# Patient Record
Sex: MALE | Race: WHITE | Employment: UNEMPLOYED | URBAN - METROPOLITAN AREA
[De-identification: names, ages, dates, MRNs, and addresses within clinical notes are randomized per-mention and may not be internally consistent; named-entity substitution may affect disease eponyms.]

---

## 2017-11-28 ENCOUNTER — OFFICE VISIT (OUTPATIENT)
Dept: VASCULAR SURGERY | Age: 72
End: 2017-11-28

## 2017-11-28 VITALS
SYSTOLIC BLOOD PRESSURE: 132 MMHG | BODY MASS INDEX: 33.86 KG/M2 | HEIGHT: 72 IN | WEIGHT: 250 LBS | DIASTOLIC BLOOD PRESSURE: 72 MMHG

## 2017-11-28 DIAGNOSIS — I70.242 ATHEROSCLEROSIS OF NATIVE ARTERIES OF LEFT LEG WITH ULCERATION OF CALF (HCC): Primary | ICD-10-CM

## 2017-11-28 PROCEDURE — G8427 DOCREV CUR MEDS BY ELIG CLIN: HCPCS | Performed by: SURGERY

## 2017-11-28 PROCEDURE — G8484 FLU IMMUNIZE NO ADMIN: HCPCS | Performed by: SURGERY

## 2017-11-28 PROCEDURE — 4040F PNEUMOC VAC/ADMIN/RCVD: CPT | Performed by: SURGERY

## 2017-11-28 PROCEDURE — 99204 OFFICE O/P NEW MOD 45 MIN: CPT | Performed by: SURGERY

## 2017-11-28 PROCEDURE — G8417 CALC BMI ABV UP PARAM F/U: HCPCS | Performed by: SURGERY

## 2017-11-28 PROCEDURE — G8598 ASA/ANTIPLAT THER USED: HCPCS | Performed by: SURGERY

## 2017-11-28 PROCEDURE — 1123F ACP DISCUSS/DSCN MKR DOCD: CPT | Performed by: SURGERY

## 2017-11-28 PROCEDURE — 4004F PT TOBACCO SCREEN RCVD TLK: CPT | Performed by: SURGERY

## 2017-11-28 PROCEDURE — 3017F COLORECTAL CA SCREEN DOC REV: CPT | Performed by: SURGERY

## 2017-11-28 RX ORDER — PRIMIDONE 50 MG/1
50 TABLET ORAL EVERY 6 HOURS PRN
COMMUNITY

## 2017-11-28 RX ORDER — METOPROLOL SUCCINATE 100 MG/1
100 TABLET, EXTENDED RELEASE ORAL 2 TIMES DAILY
Status: ON HOLD | COMMUNITY
End: 2017-12-18 | Stop reason: CLARIF

## 2017-11-28 RX ORDER — METHOCARBAMOL 500 MG/1
500 TABLET, FILM COATED ORAL 4 TIMES DAILY
COMMUNITY

## 2017-11-28 RX ORDER — SIMVASTATIN 20 MG
20 TABLET ORAL NIGHTLY
COMMUNITY

## 2017-11-28 RX ORDER — ZOLPIDEM TARTRATE 10 MG/1
TABLET ORAL NIGHTLY PRN
COMMUNITY

## 2017-11-28 RX ORDER — TRAMADOL HYDROCHLORIDE 50 MG/1
50 TABLET ORAL EVERY 6 HOURS PRN
COMMUNITY

## 2017-11-28 RX ORDER — LISINOPRIL 20 MG/1
20 TABLET ORAL DAILY
Status: ON HOLD | COMMUNITY
End: 2017-12-18 | Stop reason: SDUPTHER

## 2017-11-28 ASSESSMENT — ENCOUNTER SYMPTOMS
GASTROINTESTINAL NEGATIVE: 1
EYES NEGATIVE: 1
RESPIRATORY NEGATIVE: 1

## 2017-11-28 NOTE — PROGRESS NOTES
Subjective:      Patient ID: Lauro Lucio is a 81 y.o. male. Patient referred here today from the [facility] [place] [facility] (Dr. Cal Junior). Patient with an ulceration over the left anterior tibial region. Patient's medical history is significant for lupus and coronary artery disease, morbid obesity, and atrial fibrillation. He was noted on noninvasive studies to have decrease of transcutaneous oxygen measurements around the area of the ulceration. As a result is referred here for further arterial evaluation. His history is also significant for left knee replacement with postoperative DVT. He is unclear the details of the DVT. He also has a history of significant trauma to the left lateral calf when he was younger. Denies any history of claudication. Does complain of pain at the ulceration site. 23 year history of tobacco abuse and quit 30 years ago. Review of Systems   Constitutional: Negative. HENT: Negative. Eyes: Negative. Respiratory: Negative. Cardiovascular: Negative. Gastrointestinal: Negative. Genitourinary: Negative. Musculoskeletal: Negative. Neurological: Negative. Hematological: Bruises/bleeds easily. Objective:   Physical Exam   Constitutional: He appears well-developed and well-nourished. HENT:   Head: Normocephalic and atraumatic. Eyes: Pupils are equal, round, and reactive to light. Neck: Normal range of motion. Neck supple. No JVD present. Cardiovascular: Normal rate. Pulses:       Carotid pulses are 2+ on the right side, and 2+ on the left side. Radial pulses are 2+ on the right side, and 2+ on the left side. Femoral pulses are 2+ on the right side, and 2+ on the left side. Popliteal pulses are 1+ on the right side, and 1+ on the left side. Dorsalis pedis pulses are 0 on the right side, and 0 on the left side. Posterior tibial pulses are 0 on the right side, and 0 on the left side.    Pulmonary/Chest: Effort normal and breath sounds normal.   Abdominal: Bowel sounds are normal.   Skin: Skin is warm and dry. Ecchymosis noted. No cyanosis. Assessment:      Atherosclerosis arteries with left anterior tibial ulceration  History of DVT  A fib      Plan:      82-year-old male with left anterior tibial ulceration, likely related to underlying history of venous insufficiency and lymphatic disease. Additionally, his noninvasive studies and TCP O2 would suggest arterial insufficiency at the area of the ulceration. He does have a 23 year history of tobacco abuse. I recommended moving forward with angiographic evaluation and possible intervention of the left lower extremity. He ultimately would benefit from compression, however, giving his underlying current arterial insufficiency would hold on any significant compression at this time. Details of the antrum including all risks were discussed.

## 2017-11-30 ENCOUNTER — TELEPHONE (OUTPATIENT)
Dept: VASCULAR SURGERY | Age: 72
End: 2017-11-30

## 2017-11-30 NOTE — TELEPHONE ENCOUNTER
Patient to be bridged to Lovenox prior to Angiogram. Last dose of Coumadin 12/12. On 12/15 Lovenox shot at 8pm. 12/16 Lovenox 8AM and 8PM. 12/17 Lovenox 8 Am. Angiogram scheduled for 12/18. All info given to Wife per request of patient.

## 2023-05-03 ENCOUNTER — TELEPHONE (OUTPATIENT)
Dept: CARDIOLOGY CLINIC | Age: 78
End: 2023-05-03

## 2023-05-03 NOTE — TELEPHONE ENCOUNTER
Patient was referred o the [address] [place] [address]  location with Dr. Fanta Estrella. Patient has been scheduled for 07/06 at 9:00am (am/pm). Patient verbalized understanding of appointment instructions. Call complete.

## 2023-05-25 ENCOUNTER — HOSPITAL ENCOUNTER (OUTPATIENT)
Dept: CT IMAGING | Age: 78
Discharge: HOME | End: 2023-05-25
Payer: MEDICARE

## 2023-05-25 DIAGNOSIS — R63.0 ANOREXIA: ICD-10-CM

## 2023-05-25 DIAGNOSIS — R10.84 GENERALIZED ABDOMINAL PAIN: ICD-10-CM

## 2023-05-25 PROCEDURE — 74150 CT ABDOMEN W/O CONTRAST: CPT

## 2023-07-03 PROBLEM — I49.5 SSS (SICK SINUS SYNDROME) (HCC): Status: ACTIVE | Noted: 2023-07-03

## 2023-07-06 ENCOUNTER — TELEPHONE (OUTPATIENT)
Dept: PHARMACY | Age: 78
End: 2023-07-06

## 2023-07-06 ENCOUNTER — OFFICE VISIT (OUTPATIENT)
Dept: CARDIOLOGY CLINIC | Age: 78
End: 2023-07-06
Payer: MEDICARE

## 2023-07-06 ENCOUNTER — NURSE ONLY (OUTPATIENT)
Dept: CARDIOLOGY CLINIC | Age: 78
End: 2023-07-06

## 2023-07-06 VITALS
SYSTOLIC BLOOD PRESSURE: 110 MMHG | DIASTOLIC BLOOD PRESSURE: 70 MMHG | BODY MASS INDEX: 31.69 KG/M2 | WEIGHT: 234 LBS | HEART RATE: 80 BPM | OXYGEN SATURATION: 98 % | HEIGHT: 72 IN

## 2023-07-06 DIAGNOSIS — I25.10 ATHEROSCLEROSIS OF NATIVE CORONARY ARTERY OF NATIVE HEART WITHOUT ANGINA PECTORIS: ICD-10-CM

## 2023-07-06 DIAGNOSIS — R94.31 ECG ABNORMAL: ICD-10-CM

## 2023-07-06 DIAGNOSIS — I49.5 SSS (SICK SINUS SYNDROME) (HCC): ICD-10-CM

## 2023-07-06 DIAGNOSIS — R60.0 BILATERAL LOWER EXTREMITY EDEMA: ICD-10-CM

## 2023-07-06 DIAGNOSIS — I48.0 AF (PAROXYSMAL ATRIAL FIBRILLATION) (HCC): ICD-10-CM

## 2023-07-06 DIAGNOSIS — I10 ESSENTIAL HYPERTENSION: ICD-10-CM

## 2023-07-06 DIAGNOSIS — Z95.0 PRESENCE OF CARDIAC PACEMAKER: Primary | ICD-10-CM

## 2023-07-06 DIAGNOSIS — E66.9 OBESITY (BMI 30-39.9): ICD-10-CM

## 2023-07-06 DIAGNOSIS — I48.21 PERMANENT ATRIAL FIBRILLATION (HCC): Primary | ICD-10-CM

## 2023-07-06 DIAGNOSIS — Z95.0 PRESENCE OF CARDIAC PACEMAKER: ICD-10-CM

## 2023-07-06 DIAGNOSIS — G47.33 OSA (OBSTRUCTIVE SLEEP APNEA): ICD-10-CM

## 2023-07-06 PROCEDURE — 3078F DIAST BP <80 MM HG: CPT | Performed by: INTERNAL MEDICINE

## 2023-07-06 PROCEDURE — 1123F ACP DISCUSS/DSCN MKR DOCD: CPT | Performed by: INTERNAL MEDICINE

## 2023-07-06 PROCEDURE — 99204 OFFICE O/P NEW MOD 45 MIN: CPT | Performed by: INTERNAL MEDICINE

## 2023-07-06 PROCEDURE — 3074F SYST BP LT 130 MM HG: CPT | Performed by: INTERNAL MEDICINE

## 2023-07-06 RX ORDER — ASCORBIC ACID 500 MG
500 TABLET ORAL DAILY
COMMUNITY

## 2023-07-06 RX ORDER — ROSUVASTATIN CALCIUM 5 MG/1
5 TABLET, COATED ORAL DAILY
COMMUNITY

## 2023-07-06 RX ORDER — FUROSEMIDE 20 MG/1
20 TABLET ORAL DAILY PRN
COMMUNITY
Start: 2023-04-24

## 2023-07-06 NOTE — TELEPHONE ENCOUNTER
Received signed referral from Dr Thad Ramires. Pts warfarin has been managed by PCP @ [facility]. INR on 6/30 1.1. Sw pt today hes having INR checked again on 7/7. Pt wanted me to s/w his friend Francie Ruiz who scheduled initial appt w/ [facility] on 7/12. Pt will arrive 20 minutes early to register. Copy of referral taken to registration.

## 2023-07-06 NOTE — PROGRESS NOTES
Patient comes in for programming evaluation for his pacemaker. New to our office today. Patient has [address] [address] [employer] MRI-1/5/2022-Existing leads-2010  Hx: SSS, PAF    1 short noise episode noted on RV lead. All other sensing and pacing parameters are within normal range. Battery life 10.3 years  VVIR 60 bpm   18%. Patient in AF. Patient remains on warfarin and metoprolol. Patient will see Dr. Angela Pettit today and follow up in 3 months in office or remotely. Home Monitor-Patient has Home Monitor-will transfer if he decides to transfer to our clinic.

## 2023-07-07 LAB — INR BLD: 1.5

## 2023-07-12 ENCOUNTER — ANTI-COAG VISIT (OUTPATIENT)
Dept: PHARMACY | Age: 78
End: 2023-07-12
Payer: MEDICARE

## 2023-07-12 DIAGNOSIS — I48.91 ATRIAL FIBRILLATION, UNSPECIFIED TYPE (HCC): Primary | ICD-10-CM

## 2023-07-12 LAB — INTERNATIONAL NORMALIZATION RATIO, POC: 2.2

## 2023-07-12 PROCEDURE — 99213 OFFICE O/P EST LOW 20 MIN: CPT

## 2023-07-12 PROCEDURE — 85610 PROTHROMBIN TIME: CPT

## 2023-07-12 RX ORDER — PANTOPRAZOLE SODIUM 40 MG/1
40 TABLET, DELAYED RELEASE ORAL 2 TIMES DAILY
Qty: 60 TABLET | Refills: 1 | COMMUNITY
Start: 2023-06-30 | End: 2023-08-29

## 2023-07-12 RX ORDER — SUCRALFATE ORAL 1 G/10ML
1 SUSPENSION ORAL 4 TIMES DAILY
Qty: 1200 ML | Refills: 0 | COMMUNITY
Start: 2023-06-30 | End: 2023-07-30

## 2023-07-12 RX ORDER — FLUCONAZOLE 100 MG/1
TABLET ORAL
COMMUNITY
Start: 2023-07-07

## 2023-07-12 NOTE — PROGRESS NOTES
Mr. Chepe Charles is a 70 y.o. y/o male with history of Afib who presents today for anticoagulation monitoring and adjustment. Pertinent PMH: HTN, DVT, CAD, factor V leiden, gastric bypass   Per cardio 7/2023:  Discussed changing to Eliquis once his GI issues have been resolved. Hx of GI bleed. Pt pt has been on warfarin for many years prior to clinic managing   Patient Reported Findings:  Yes     No  [x]   []       Patient verifies current dosing regimen as listed takes warfarin 4 mg qd. Pt takes 6 mg on sun, mon, tues and thurs and 4 mg all other days of the week   []   [x]       S/S bleeding/bruising/swelling/SOB -denies   []   [x]       Blood in urine or stool denies   [x]   []       Procedures scheduled in the future at this time Pulled a few teeth recently. Did not hold warfarin. Had endoscopy on 6/30, held warfarin and bridged with lovenox. Was found to have ulcers so taking meds. Will need to have repeat endoscopy in near future--> Once his INR is 2-3 for 4 weeks we will consider doing a DCCV to determine if he feels better in SR  []   [x]       Missed Dose denies   []   [x]       Extra Dose denies  [x]   []       Change in medications reviewed med list and updated. Since EGD pt taking sucralfate QID x 30 d, pantoprazole, and fluconazole x 20 d   [x]   []       Change in health/diet/appetite - cabbage, greens, broccoli, brussel sprouts. Is going to assess consistent amount in next few weeks   []   [x]       Change in alcohol use beer and moonshine. None recently d/t stomach ulcers. Normally in past would have 1-2 shots every night   []   [x]       Change in activity  []   [x]       Hospital admission  []   [x]       Emergency department visit  [x]   []       Other complaints Pt had INR checked 6/30 at PCP office via [facility] lab, who managed previously, was 1.1. Pr pt wife, was off warfarin for endoscopy 6/30 at time of last INR. Was bridged with lovenox. Had INR rechecked 7/7 and was 1. 5. was told

## 2023-07-13 ENCOUNTER — PROCEDURE VISIT (OUTPATIENT)
Dept: CARDIOLOGY CLINIC | Age: 78
End: 2023-07-13
Payer: MEDICARE

## 2023-07-13 DIAGNOSIS — Z95.0 PRESENCE OF CARDIAC PACEMAKER: ICD-10-CM

## 2023-07-13 DIAGNOSIS — R94.31 ECG ABNORMAL: ICD-10-CM

## 2023-07-13 LAB
LV EF: 53 %
LVEF MODALITY: NORMAL

## 2023-07-13 PROCEDURE — 93306 TTE W/DOPPLER COMPLETE: CPT | Performed by: INTERNAL MEDICINE

## 2023-07-17 ENCOUNTER — TELEPHONE (OUTPATIENT)
Dept: CARDIOLOGY CLINIC | Age: 78
End: 2023-07-17

## 2023-07-19 ENCOUNTER — ANTI-COAG VISIT (OUTPATIENT)
Dept: PHARMACY | Age: 78
End: 2023-07-19
Payer: MEDICARE

## 2023-07-19 ENCOUNTER — HOSPITAL ENCOUNTER (OUTPATIENT)
Dept: NON INVASIVE DIAGNOSTICS | Age: 78
Discharge: HOME | End: 2023-07-19
Attending: INTERNAL MEDICINE
Payer: MEDICARE

## 2023-07-19 DIAGNOSIS — R94.31 ECG ABNORMAL: ICD-10-CM

## 2023-07-19 DIAGNOSIS — I25.10 ATHEROSCLEROSIS OF NATIVE CORONARY ARTERY OF NATIVE HEART WITHOUT ANGINA PECTORIS: ICD-10-CM

## 2023-07-19 DIAGNOSIS — I48.91 ATRIAL FIBRILLATION, UNSPECIFIED TYPE (HCC): Primary | ICD-10-CM

## 2023-07-19 LAB
INTERNATIONAL NORMALIZATION RATIO, POC: 6.9
LV EF: 54 %
LVEF MODALITY: NORMAL

## 2023-07-19 PROCEDURE — 78452 HT MUSCLE IMAGE SPECT MULT: CPT | Performed by: INTERNAL MEDICINE

## 2023-07-19 PROCEDURE — A9502 TC99M TETROFOSMIN: HCPCS | Performed by: INTERNAL MEDICINE

## 2023-07-19 PROCEDURE — 85610 PROTHROMBIN TIME: CPT

## 2023-07-19 PROCEDURE — 6360000002 HC RX W HCPCS: Performed by: INTERNAL MEDICINE

## 2023-07-19 PROCEDURE — 99212 OFFICE O/P EST SF 10 MIN: CPT

## 2023-07-19 PROCEDURE — 93017 CV STRESS TEST TRACING ONLY: CPT | Performed by: INTERNAL MEDICINE

## 2023-07-19 PROCEDURE — 3430000000 HC RX DIAGNOSTIC RADIOPHARMACEUTICAL: Performed by: INTERNAL MEDICINE

## 2023-07-19 RX ORDER — REGADENOSON 0.08 MG/ML
0.4 INJECTION, SOLUTION INTRAVENOUS
Status: COMPLETED | OUTPATIENT
Start: 2023-07-19 | End: 2023-07-19

## 2023-07-19 RX ADMIN — TETROFOSMIN 10 MILLICURIE: 1.38 INJECTION, POWDER, LYOPHILIZED, FOR SOLUTION INTRAVENOUS at 08:02

## 2023-07-19 RX ADMIN — REGADENOSON 0.4 MG: 0.08 INJECTION, SOLUTION INTRAVENOUS at 09:10

## 2023-07-19 RX ADMIN — TETROFOSMIN 30 MILLICURIE: 1.38 INJECTION, POWDER, LYOPHILIZED, FOR SOLUTION INTRAVENOUS at 09:13

## 2023-07-19 NOTE — PROGRESS NOTES
Mr. Leila Washington is a 70 y.o. y/o male with history of Afib who presents today for anticoagulation monitoring and adjustment. Pertinent PMH: HTN, DVT, CAD, factor V leiden, gastric bypass   Per cardio 7/2023:  Discussed changing to Eliquis once his GI issues have been resolved. Hx of GI bleed. Pt pt has been on warfarin for many years prior to clinic managing   Patient Reported Findings:  Yes     No  [x]   []       Patient verifies current dosing regimen as listed takes warfarin 4 mg qd. Pt takes 6 mg on sun, mon, tues and thurs and 4 mg all other days of the week---> confirmed dose    []   [x]       S/S bleeding/bruising/swelling/SOB -denies   []   [x]       Blood in urine or stool denies   [x]   []       Procedures scheduled in the future at this time Pulled a few teeth recently. Did not hold warfarin. Had endoscopy on 6/30, held warfarin and bridged with lovenox. Was found to have ulcers so taking meds. Will need to have repeat endoscopy in near future--> Once his INR is 2-3 for 4 weeks we will consider doing a DCCV to determine if he feels better in SR  []   [x]       Missed Dose denies   []   [x]       Extra Dose denies  [x]   []       Change in medications reviewed med list and updated. Since EGD pt taking sucralfate QID x 30 d, pantoprazole, and fluconazole x 20 d   [x]   []       Change in health/diet/appetite - cabbage, greens, broccoli, brussel sprouts. Is going to assess consistent amount in next few weeks---> no vit k recently    []   [x]       Change in alcohol use beer and moonshine. None recently d/t stomach ulcers. Normally in past would have 1-2 shots every night ---> nothing in past week   []   [x]       Change in activity  []   [x]       Hospital admission  []   [x]       Emergency department visit  [x]   []       Other complaints Pt had INR checked 6/30 at PCP office via [facility] lab, who managed previously, was 1.1. Pr pt wife, was off warfarin for endoscopy 6/30 at time of last INR.

## 2023-07-25 ENCOUNTER — ANTI-COAG VISIT (OUTPATIENT)
Dept: PHARMACY | Age: 78
End: 2023-07-25
Payer: MEDICARE

## 2023-07-25 DIAGNOSIS — I48.91 ATRIAL FIBRILLATION, UNSPECIFIED TYPE (HCC): Primary | ICD-10-CM

## 2023-07-25 LAB — INTERNATIONAL NORMALIZATION RATIO, POC: 3.9

## 2023-07-25 PROCEDURE — 85610 PROTHROMBIN TIME: CPT

## 2023-07-25 PROCEDURE — 99212 OFFICE O/P EST SF 10 MIN: CPT

## 2023-07-25 NOTE — PROGRESS NOTES
Mr. Marlin Sawyer is a 70 y.o. y/o male with history of Afib who presents today for anticoagulation monitoring and adjustment. Pertinent PMH: HTN, DVT, CAD, factor V leiden, gastric bypass   Per cardio 7/2023:  Discussed changing to Eliquis once his GI issues have been resolved. Hx of GI bleed. Pt pt has been on warfarin for many years prior to clinic managing   Patient Reported Findings:  Yes     No  [x]   []       Patient verifies current dosing regimen as listed takes warfarin 4 mg qd. Pt takes 6 mg on sun, mon, tues and thurs and 4 mg all other days of the week---> confirmed dose    []   [x]       S/S bleeding/bruising/swelling/SOB -denies   []   [x]       Blood in urine or stool denies   [x]   []       Procedures scheduled in the future at this time Pulled a few teeth recently. Did not hold warfarin. Had endoscopy on 6/30, held warfarin and bridged with lovenox. Was found to have ulcers so taking meds. Will need to have repeat endoscopy in near future--> Once his INR is 2-3 for 4 weeks we will consider doing a DCCV to determine if he feels better in SR  []   [x]       Missed Dose denies   []   [x]       Extra Dose denies  [x]   []       Change in medications reviewed med list and updated. Since EGD pt taking sucralfate QID x 30 d, pantoprazole, and fluconazole x 20 d --> no changes   [x]   []       Change in health/diet/appetite - cabbage, greens, broccoli, brussel sprouts. Is going to assess consistent amount in next few weeks---> no vit k recently  --> had a can of greens and one other serving of greens. Doesn't plan to have as much as this week   []   [x]       Change in alcohol use beer and moonshine. None recently d/t stomach ulcers.  Normally in past would have 1-2 shots every night ---> nothing in past week   []   [x]       Change in activity  []   [x]       Hospital admission  []   [x]       Emergency department visit  [x]   []       Other complaints Pt had INR checked 6/30 at PCP office via [facility]

## 2023-08-02 ENCOUNTER — ANTI-COAG VISIT (OUTPATIENT)
Dept: PHARMACY | Age: 78
End: 2023-08-02
Payer: MEDICARE

## 2023-08-02 DIAGNOSIS — I48.91 ATRIAL FIBRILLATION, UNSPECIFIED TYPE (HCC): Primary | ICD-10-CM

## 2023-08-02 LAB — INTERNATIONAL NORMALIZATION RATIO, POC: 3.6

## 2023-08-02 PROCEDURE — 85610 PROTHROMBIN TIME: CPT

## 2023-08-02 PROCEDURE — 99212 OFFICE O/P EST SF 10 MIN: CPT

## 2023-08-04 ENCOUNTER — TELEPHONE (OUTPATIENT)
Dept: PHARMACY | Age: 78
End: 2023-08-04

## 2023-08-04 NOTE — TELEPHONE ENCOUNTER
----- Message from Danis Montilla sent at 8/4/2023  1:09 PM EDT -----  Regarding: medication change  Contact: wife  Wife called to say that patient's pantoprazole 40mg x2 per day has been extended two more weeks. A repeat endoscopy has been scheduled for September and patient will need to hold 5 days prior to the procedure.  Please call regarding warfarin. [phone]

## 2023-08-04 NOTE — TELEPHONE ENCOUNTER
Returned call to wife. Explained no interaction with pantoprazole and warfarin. Finished fluconazole and will not be resuming. Endoscopy scheduled for 9/25. Will need to hold warfarin 5 days prior and will need bridged with lovenox. After reviewing chart, determined pt has had INR >2 for 4 weeks. Advised if pt wants to move forward with CV, for pt to contact cardiology to schedule. If CV scheduled for next week call clinic back and will move INR check.

## 2023-08-09 ENCOUNTER — ANTI-COAG VISIT (OUTPATIENT)
Dept: PHARMACY | Age: 78
End: 2023-08-09
Payer: MEDICARE

## 2023-08-09 DIAGNOSIS — I48.91 ATRIAL FIBRILLATION, UNSPECIFIED TYPE (HCC): Primary | ICD-10-CM

## 2023-08-09 LAB — INTERNATIONAL NORMALIZATION RATIO, POC: 1.6

## 2023-08-09 PROCEDURE — 99212 OFFICE O/P EST SF 10 MIN: CPT

## 2023-08-09 PROCEDURE — 85610 PROTHROMBIN TIME: CPT

## 2023-08-09 NOTE — PROGRESS NOTES
Mr. Neli Gutierrez is a 70 y.o. y/o male with history of Afib who presents today for anticoagulation monitoring and adjustment. Pertinent PMH: HTN, DVT, CAD, factor V leiden, gastric bypass   Per cardio 7/2023:  Discussed changing to Eliquis once his GI issues have been resolved. Hx of GI bleed. Pt pt has been on warfarin for many years prior to clinic managing   Patient Reported Findings:  Yes     No  [x]   []       Patient verifies current dosing regimen as listed takes warfarin 4 mg qd. Pt takes 6 mg on sun, mon, tues and thurs and 4 mg all other days of the week---> confirmed dose    []   [x]       S/S bleeding/bruising/swelling/SOB -denies   []   [x]       Blood in urine or stool denies   [x]   []       Procedures scheduled in the future at this time Pulled a few teeth recently. Did not hold warfarin. Had endoscopy on 6/30, held warfarin and bridged with lovenox. Was found to have ulcers so taking meds. Will need to have repeat endoscopy in near future--> Once his INR is 2-3 for 4 weeks we will consider doing a DCCV to determine if he feels better in SR---> endoscopy 9/25, holding 5 days and bridging   []   [x]       Missed Dose denies   []   [x]       Extra Dose denies  [x]   []       Change in medications reviewed med list and updated. Since EGD pt taking sucralfate QID x 30 d, pantoprazole, and fluconazole x 20 d ---> states finished fluconazole Monday, sees doctor Friday to see if needs more---> pantoprazole extended, finished fluconazole     [x]   []       Change in health/diet/appetite - cabbage, greens, broccoli, brussel sprouts. Is going to assess consistent amount in next few weeks---> no vit k recently  --> had a can of greens and one other serving of greens. Doesn't plan to have as much as this week ---> no greens this week---> one can of mixed greens    []   [x]       Change in alcohol use beer and moonshine. None recently d/t stomach ulcers.  Normally in past would have 1-2 shots every night

## 2023-08-16 ENCOUNTER — ANTI-COAG VISIT (OUTPATIENT)
Dept: PHARMACY | Age: 78
End: 2023-08-16
Payer: MEDICARE

## 2023-08-16 DIAGNOSIS — I48.91 ATRIAL FIBRILLATION, UNSPECIFIED TYPE (HCC): Primary | ICD-10-CM

## 2023-08-16 LAB — INTERNATIONAL NORMALIZATION RATIO, POC: 1.7

## 2023-08-16 PROCEDURE — 99211 OFF/OP EST MAY X REQ PHY/QHP: CPT

## 2023-08-16 PROCEDURE — 85610 PROTHROMBIN TIME: CPT

## 2023-08-16 NOTE — PROGRESS NOTES
Mr. Abel Wong is a 70 y.o. y/o male with history of Afib who presents today for anticoagulation monitoring and adjustment. Pertinent PMH: HTN, DVT, CAD, factor V leiden, gastric bypass   Per cardio 7/2023:  Discussed changing to Eliquis once his GI issues have been resolved. Hx of GI bleed. Pt pt has been on warfarin for many years prior to clinic managing   Patient Reported Findings:  Yes     No  [x]   []       Patient verifies current dosing regimen as listed takes warfarin 4 mg qd. Pt takes 6 mg on sun, mon, tues and thurs and 4 mg all other days of the week---> confirmed dose    []   [x]       S/S bleeding/bruising/swelling/SOB -denies   []   [x]       Blood in urine or stool denies   [x]   []       Procedures scheduled in the future at this time Pulled a few teeth recently. Did not hold warfarin. Had endoscopy on 6/30, held warfarin and bridged with lovenox. Was found to have ulcers so taking meds. Will need to have repeat endoscopy in near future--> Once his INR is 2-3 for 4 weeks we will consider doing a DCCV to determine if he feels better in SR---> endoscopy 9/25, holding 5 days and bridging   []   [x]       Missed Dose denies   []   [x]       Extra Dose denies  [x]   []       Change in medications reviewed med list and updated. Since EGD pt taking sucralfate QID x 30 d, pantoprazole, and fluconazole x 20 d ---> states finished fluconazole Monday, sees doctor Friday to see if needs more---> pantoprazole extended, finished fluconazole --> finished sucralfate a while ago. No other changes in meds     [x]   []       Change in health/diet/appetite - cabbage, greens, broccoli, brussel sprouts. Is going to assess consistent amount in next few weeks---> no vit k recently  --> had a can of greens and one other serving of greens.  Doesn't plan to have as much as this week ---> no greens this week---> one can of mixed greens --> had vit k 1-2 times in week    [x]   []       Change in alcohol use beer and

## 2023-08-21 NOTE — TELEPHONE ENCOUNTER
Warfarin prescription phoned into [address] to [address] [facility] under Dr. Yue Lopez  Warfarin 4 mg tabs  Take 6 mg on Wed and Sat and 4 mg all other days of the week  90 days   2 refills

## 2023-08-24 ENCOUNTER — ANTI-COAG VISIT (OUTPATIENT)
Dept: PHARMACY | Age: 78
End: 2023-08-24
Payer: MEDICARE

## 2023-08-24 DIAGNOSIS — I48.91 ATRIAL FIBRILLATION, UNSPECIFIED TYPE (HCC): Primary | ICD-10-CM

## 2023-08-24 LAB — INTERNATIONAL NORMALIZATION RATIO, POC: 1.3

## 2023-08-24 PROCEDURE — 85610 PROTHROMBIN TIME: CPT

## 2023-08-24 PROCEDURE — 99212 OFFICE O/P EST SF 10 MIN: CPT

## 2023-08-24 NOTE — PROGRESS NOTES
Mr. Sailaja Aaron is a 70 y.o. y/o male with history of Afib who presents today for anticoagulation monitoring and adjustment. Pertinent PMH: HTN, DVT, CAD, factor V leiden, gastric bypass   Per cardio 7/2023:  Discussed changing to Eliquis once his GI issues have been resolved. Hx of GI bleed. Pt pt has been on warfarin for many years prior to clinic managing   Patient Reported Findings:  Yes     No  [x]   []       Patient verifies current dosing regimen as listed takes warfarin 4 mg qd. Pt takes 6 mg on sun, mon, tues and thurs and 4 mg all other days of the week---> confirmed dose    []   [x]       S/S bleeding/bruising/swelling/SOB -denies   []   [x]       Blood in urine or stool denies   [x]   []       Procedures scheduled in the future at this time Pulled a few teeth recently. Did not hold warfarin. Had endoscopy on 6/30, held warfarin and bridged with lovenox. Was found to have ulcers so taking meds. Will need to have repeat endoscopy in near future--> Once his INR is 2-3 for 4 weeks we will consider doing a DCCV to determine if he feels better in SR---> endoscopy 9/25, holding 5 days and bridging, no CV yet  []   [x]       Missed Dose denies   []   [x]       Extra Dose denies  [x]   []       Change in medications reviewed med list and updated. Since EGD pt taking sucralfate QID x 30 d, pantoprazole, and fluconazole x 20 d ---> states finished fluconazole Monday, sees doctor Friday to see if needs more---> pantoprazole extended, finished fluconazole --> finished sucralfate a while ago. No other changes in meds     [x]   []       Change in health/diet/appetite - cabbage, greens, broccoli, brussel sprouts. Is going to assess consistent amount in next few weeks---> no vit k recently  --> had a can of greens and one other serving of greens.  Doesn't plan to have as much as this week ---> no greens this week---> one can of mixed greens --> had vit k 1-2 times in week    [x]   []       Change in alcohol use

## 2023-08-31 ENCOUNTER — ANTI-COAG VISIT (OUTPATIENT)
Dept: PHARMACY | Age: 78
End: 2023-08-31
Payer: MEDICARE

## 2023-08-31 DIAGNOSIS — I48.91 ATRIAL FIBRILLATION, UNSPECIFIED TYPE (HCC): Primary | ICD-10-CM

## 2023-08-31 LAB — INTERNATIONAL NORMALIZATION RATIO, POC: 1.8

## 2023-08-31 PROCEDURE — 99212 OFFICE O/P EST SF 10 MIN: CPT

## 2023-08-31 PROCEDURE — 85610 PROTHROMBIN TIME: CPT

## 2023-08-31 NOTE — PROGRESS NOTES
times in week---> avoided greens in past week     [x]   []       Change in alcohol use beer and moonshine. None recently d/t stomach ulcers. Normally in past would have 1-2 shots every night ---> nothing in past week --> no changes, continues with less alcohol (one beer occasionally) ---> regular beer, two last night   []   [x]       Change in activity  []   [x]       Hospital admission  []   [x]       Emergency department visit  [x]   []       Other complaints Pt had INR checked 6/30 at PCP office via [facility] lab, who managed previously, was 1.1. Pr pt wife, was off warfarin for endoscopy 6/30 at time of last INR. Was bridged with lovenox. Had INR rechecked 7/7 and was 1. 5. was told to take 8 mg first day then return to normal dose     Clinical Outcomes:  Yes     No  []   [x]       Major bleeding event  []   [x]       Thromboembolic event    Duration of warfarin Therapy: indefinite   INR Range:  2.0-3.0      INR is 1.8 today after dose increase, had 6mg four times in past week, pt wants to increase to 6mg daily. Will start with 6mg five times/week and continue to monitor closely prior to procedure. Take 6mg today then increase weekly dose to 4mg on Sun, Tues, and Thurs and 6mg all other days   Encouraged to maintain a consistency of vegetables/salads.    Recheck INR in 1 week, 9/7    Patient consent signed 7/12/23    Referring is Dr. Siva Galicia cardio   INR (no units)   Date Value   07/07/2023 1.50   12/18/2017 1.13     INR,(POC) (no units)   Date Value   08/31/2023 1.8   08/24/2023 1.3   08/16/2023 1.7   08/09/2023 1.6     For Pharmacy Admin Tracking Only    Intervention Detail: Dose Adjustment: 1, reason: Therapy Optimization  Total # of Interventions Recommended: 1  Total # of Interventions Accepted: 1  Time Spent (min): 15

## 2023-09-07 ENCOUNTER — ANTI-COAG VISIT (OUTPATIENT)
Dept: PHARMACY | Age: 78
End: 2023-09-07
Payer: MEDICARE

## 2023-09-07 DIAGNOSIS — I48.91 ATRIAL FIBRILLATION, UNSPECIFIED TYPE (HCC): Primary | ICD-10-CM

## 2023-09-07 LAB — INTERNATIONAL NORMALIZATION RATIO, POC: 1.9

## 2023-09-07 PROCEDURE — 99211 OFF/OP EST MAY X REQ PHY/QHP: CPT

## 2023-09-07 PROCEDURE — 99212 OFFICE O/P EST SF 10 MIN: CPT

## 2023-09-07 PROCEDURE — 85610 PROTHROMBIN TIME: CPT

## 2023-09-07 NOTE — PROGRESS NOTES
Mr. Tj Mcclain is a 70 y.o. y/o male with history of Afib who presents today for anticoagulation monitoring and adjustment. Pertinent PMH: HTN, DVT, CAD, factor V leiden, gastric bypass   Per cardio 7/2023:  Discussed changing to Eliquis once his GI issues have been resolved. Hx of GI bleed. Pt pt has been on warfarin for many years prior to clinic managing   Patient Reported Findings:  Yes     No  [x]   []       Patient verifies current dosing regimen as listed takes warfarin 4 mg qd. Pt takes 6 mg on sun, mon, tues and thurs and 4 mg all other days of the week---> confirmed dose    []   [x]       S/S bleeding/bruising/swelling/SOB -denies   []   [x]       Blood in urine or stool denies   [x]   []       Procedures scheduled in the future at this time Pulled a few teeth recently. Did not hold warfarin. Had endoscopy on 6/30, held warfarin and bridged with lovenox. Was found to have ulcers so taking meds. Will need to have repeat endoscopy in near future--> Once his INR is 2-3 for 4 weeks we will consider doing a DCCV to determine if he feels better in SR---> endoscopy 9/25, holding 5 days and bridging, no CV yet, has instructions and shots from doctor   []   [x]       Missed Dose denies   []   [x]       Extra Dose denies  [x]   []       Change in medications reviewed med list and updated. Since EGD pt taking sucralfate QID x 30 d, pantoprazole, and fluconazole x 20 d ---> states finished fluconazole Monday, sees doctor Friday to see if needs more---> pantoprazole extended, finished fluconazole --> finished sucralfate a while ago. No other changes in meds ---> denies     [x]   []       Change in health/diet/appetite - cabbage, greens, broccoli, brussel sprouts. Is going to assess consistent amount in next few weeks---> no vit k recently  --> had a can of greens and one other serving of greens.  Doesn't plan to have as much as this week ---> no greens this week---> one can of mixed greens --> had vit k 1-2

## 2023-09-08 NOTE — PROGRESS NOTES
accurately reflects all work, treatment, procedures, and medical decision making performed by me.     Electronically signed by Alexa Wiggins MD on 9/24/2023 at 9:47 PM

## 2023-09-12 ENCOUNTER — OFFICE VISIT (OUTPATIENT)
Dept: CARDIOLOGY CLINIC | Age: 78
End: 2023-09-12

## 2023-09-12 VITALS
BODY MASS INDEX: 31.34 KG/M2 | DIASTOLIC BLOOD PRESSURE: 50 MMHG | HEIGHT: 72 IN | WEIGHT: 231.4 LBS | OXYGEN SATURATION: 95 % | HEART RATE: 63 BPM | SYSTOLIC BLOOD PRESSURE: 98 MMHG

## 2023-09-12 DIAGNOSIS — I48.21 PERMANENT ATRIAL FIBRILLATION (HCC): ICD-10-CM

## 2023-09-12 DIAGNOSIS — R60.0 BILATERAL LOWER EXTREMITY EDEMA: Primary | ICD-10-CM

## 2023-09-12 DIAGNOSIS — I73.9 PAD (PERIPHERAL ARTERY DISEASE) (HCC): ICD-10-CM

## 2023-09-12 DIAGNOSIS — Z95.0 PRESENCE OF CARDIAC PACEMAKER: ICD-10-CM

## 2023-09-12 DIAGNOSIS — I65.23 BILATERAL CAROTID ARTERY STENOSIS: ICD-10-CM

## 2023-09-12 DIAGNOSIS — E78.5 HYPERLIPIDEMIA LDL GOAL <70: ICD-10-CM

## 2023-09-12 DIAGNOSIS — I25.10 CAD IN NATIVE ARTERY: ICD-10-CM

## 2023-09-12 DIAGNOSIS — I49.5 SSS (SICK SINUS SYNDROME) (HCC): ICD-10-CM

## 2023-09-12 RX ORDER — ROSUVASTATIN CALCIUM 10 MG/1
10 TABLET, COATED ORAL DAILY
Qty: 90 TABLET | Refills: 3 | Status: SHIPPED | OUTPATIENT
Start: 2023-09-12

## 2023-09-12 NOTE — PATIENT INSTRUCTIONS
INCREASE CRESTOR TO 10 MG DAILY. CAROTID DOPPLER ONCE A YEAR. PLEASE SEND MOST RECENT REPORT -[phone]. RETURN TO THE OFFICE IN 6 MONTHS. CONSERVATIVE MEASURES FOR LEG SWELLING; LEG ELEVATION AND AMBULATION.

## 2023-09-14 ENCOUNTER — ANTI-COAG VISIT (OUTPATIENT)
Dept: PHARMACY | Age: 78
End: 2023-09-14
Payer: MEDICARE

## 2023-09-14 DIAGNOSIS — I48.91 ATRIAL FIBRILLATION, UNSPECIFIED TYPE (HCC): Primary | ICD-10-CM

## 2023-09-14 LAB — INTERNATIONAL NORMALIZATION RATIO, POC: 2.6

## 2023-09-14 PROCEDURE — 85610 PROTHROMBIN TIME: CPT

## 2023-09-14 PROCEDURE — 99212 OFFICE O/P EST SF 10 MIN: CPT

## 2023-09-14 RX ORDER — ENOXAPARIN SODIUM 100 MG/ML
1 INJECTION SUBCUTANEOUS 2 TIMES DAILY
Qty: 10 ML | Refills: 1 | Status: SHIPPED | OUTPATIENT
Start: 2023-09-14

## 2023-09-14 NOTE — PROGRESS NOTES
Mr. Giuseppe George is a 70 y.o. y/o male with history of Afib who presents today for anticoagulation monitoring and adjustment. Pertinent PMH: HTN, DVT, CAD, factor V leiden, gastric bypass   Per cardio 7/2023:  Discussed changing to Eliquis once his GI issues have been resolved. Hx of GI bleed. Pt pt has been on warfarin for many years prior to clinic managing   Patient Reported Findings:  Yes     No  [x]   []       Patient verifies current dosing regimen as listed takes warfarin 4 mg qd. Pt takes 6 mg on sun, mon, tues and thurs and 4 mg all other days of the week---> confirmed dose    []   [x]       S/S bleeding/bruising/swelling/SOB -denies   []   [x]       Blood in urine or stool denies   [x]   []       Procedures scheduled in the future at this time Pulled a few teeth recently. Did not hold warfarin. Had endoscopy on 6/30, held warfarin and bridged with lovenox. Was found to have ulcers so taking meds. Will need to have repeat endoscopy in near future--> Once his INR is 2-3 for 4 weeks we will consider doing a DCCV to determine if he feels better in SR---> endoscopy 9/25, holding 5 days and bridging  []   [x]       Missed Dose denies   []   [x]       Extra Dose denies  [x]   []       Change in medications reviewed med list and updated. Since EGD pt taking sucralfate QID x 30 d, pantoprazole, and fluconazole x 20 d ---> states finished fluconazole Monday, sees doctor Friday to see if needs more---> pantoprazole extended, finished fluconazole --> finished sucralfate a while ago. No other changes in meds ---> inc crestor      [x]   []       Change in health/diet/appetite - cabbage, greens, broccoli, brussel sprouts. Is going to assess consistent amount in next few weeks---> no vit k recently  --> had a can of greens and one other serving of greens.  Doesn't plan to have as much as this week ---> no greens this week---> one can of mixed greens --> had vit k 1-2 times in week---> avoided greens in past week  -->

## 2023-09-29 ENCOUNTER — ANTI-COAG VISIT (OUTPATIENT)
Dept: PHARMACY | Age: 78
End: 2023-09-29
Payer: MEDICARE

## 2023-09-29 DIAGNOSIS — I48.91 ATRIAL FIBRILLATION, UNSPECIFIED TYPE (HCC): Primary | ICD-10-CM

## 2023-09-29 LAB — INTERNATIONAL NORMALIZATION RATIO, POC: 1.4

## 2023-09-29 PROCEDURE — 99212 OFFICE O/P EST SF 10 MIN: CPT

## 2023-09-29 PROCEDURE — 85610 PROTHROMBIN TIME: CPT

## 2023-09-29 NOTE — PROGRESS NOTES
Mr. Shital Borden is a 70 y.o. y/o male with history of Afib who presents today for anticoagulation monitoring and adjustment. Pertinent PMH: HTN, DVT, CAD, factor V leiden, gastric bypass   Per cardio 7/2023:  Discussed changing to Eliquis once his GI issues have been resolved. Hx of GI bleed. Pt pt has been on warfarin for many years prior to clinic managing   Patient Reported Findings:  Yes     No  [x]   []       Patient verifies current dosing regimen as listed takes warfarin 4 mg qd. Pt takes 6 mg on sun, mon, tues and thurs and 4 mg all other days of the week---> confirmed dose    []   [x]       S/S bleeding/bruising/swelling/SOB -denies   []   [x]       Blood in urine or stool denies   [x]   []       Procedures scheduled in the future at this time Pulled a few teeth recently. Did not hold warfarin. Had endoscopy on 6/30, held warfarin and bridged with lovenox. Was found to have ulcers so taking meds. Will need to have repeat endoscopy in near future--> Once his INR is 2-3 for 4 weeks we will consider doing a DCCV to determine if he feels better in SR---> endoscopy 9/25, holding 5 days and bridging  []   [x]       Missed Dose denies   []   [x]       Extra Dose denies  [x]   []       Change in medications Since EGD pt taking sucralfate QID x 30 d, pantoprazole, and fluconazole x 20 d ---> states finished fluconazole Monday, sees doctor Friday to see if needs more---> pantoprazole extended, finished fluconazole --> finished sucralfate a while ago. No other changes in meds ---> inc crestor --> will be on pantoprazole for 2 months possibly longer      []   [x]       Change in health/diet/appetite - cabbage, greens, broccoli, brussel sprouts. Is going to assess consistent amount in next few weeks---> no vit k recently  --> had a can of greens and one other serving of greens.  Doesn't plan to have as much as this week ---> no greens this week---> one can of mixed greens --> had vit k 1-2 times in week--->

## 2023-10-04 ENCOUNTER — ANTI-COAG VISIT (OUTPATIENT)
Dept: PHARMACY | Age: 78
End: 2023-10-04
Payer: COMMERCIAL

## 2023-10-04 DIAGNOSIS — I48.91 ATRIAL FIBRILLATION, UNSPECIFIED TYPE (HCC): Primary | ICD-10-CM

## 2023-10-04 LAB — INTERNATIONAL NORMALIZATION RATIO, POC: 2.8

## 2023-10-04 PROCEDURE — 99211 OFF/OP EST MAY X REQ PHY/QHP: CPT

## 2023-10-04 PROCEDURE — 85610 PROTHROMBIN TIME: CPT

## 2023-10-04 NOTE — PROGRESS NOTES
had vit k 1-2 times in week---> avoided greens in past week  --> no vit k, but will return to having once a week --> no greens  []   [x]       Change in alcohol use beer and moonshine. None recently d/t stomach ulcers. Normally in past would have 1-2 shots every night ---> nothing in past week --> no changes, continues with less alcohol (one beer occasionally) ---> regular beer, two last night --> no changes   []   [x]       Change in activity  []   [x]       Hospital admission  []   [x]       Emergency department visit  [x]   []       Other complaints Pt had INR checked  at PCP office via [facility] lab, who managed previously, was 1.1. Pr pt wife, was off warfarin for endoscopy  at time of last INR. Was bridged with lovenox. Had INR rechecked  and was 1. 5. was told to take 8 mg first day then return to normal dose     Clinical Outcomes:  Yes     No  []   [x]       Major bleeding event  []   [x]       Thromboembolic event    Duration of warfarin Therapy: indefinite   INR Range:  2.0-3.0      INR is 2.8 today  Will need to assess weekly dose of 6mg daily but pt adamant about continuing on with it. Will add greens back into diet as well, was holding off. Take 6mg daily. Stop shots. Encouraged to maintain a consistency of vegetables/salads. Gave RF from [facility].   Recheck INR in 5 days, 10/10    Patient consent signed 23    Referring is Dr. Kenyatta Barnes cardio   INR (no units)   Date Value   2023 1.50   2017 1.13     INR,(POC) (no units)   Date Value   10/04/2023 2.8   2023 1.4   2023 2.6   2023 1.9     For Pharmacy Admin Tracking Only    Intervention Detail: Adherence Monitorin  Total # of Interventions Recommended: 1  Total # of Interventions Accepted: 1  Time Spent (min): 15

## 2023-10-10 ENCOUNTER — ANTI-COAG VISIT (OUTPATIENT)
Dept: PHARMACY | Age: 78
End: 2023-10-10
Payer: COMMERCIAL

## 2023-10-10 DIAGNOSIS — I48.91 ATRIAL FIBRILLATION, UNSPECIFIED TYPE (HCC): Primary | ICD-10-CM

## 2023-10-10 LAB — INTERNATIONAL NORMALIZATION RATIO, POC: 2.5

## 2023-10-10 PROCEDURE — 85610 PROTHROMBIN TIME: CPT

## 2023-10-10 PROCEDURE — 99211 OFF/OP EST MAY X REQ PHY/QHP: CPT

## 2023-10-10 NOTE — PROGRESS NOTES
Mr. Vitaliy Zaidi is a 70 y.o. y/o male with history of Afib who presents today for anticoagulation monitoring and adjustment. Pertinent PMH: HTN, DVT, CAD, factor V leiden, gastric bypass   Per cardio 7/2023:  Discussed changing to Eliquis once his GI issues have been resolved. Hx of GI bleed. Pt pt has been on warfarin for many years prior to clinic managing   Patient Reported Findings:  Yes     No  [x]   []       Patient verifies current dosing regimen as listed takes warfarin 4 mg qd. Pt takes 6 mg on sun, mon, tues and thurs and 4 mg all other days of the week---> confirmed dose    []   [x]       S/S bleeding/bruising/swelling/SOB -does have bruising from shots---> denies     []   [x]       Blood in urine or stool denies   [x]   []       Procedures scheduled in the future at this time Pulled a few teeth recently. Did not hold warfarin. Had endoscopy on 6/30, held warfarin and bridged with lovenox. Was found to have ulcers so taking meds. Will need to have repeat endoscopy in near future--> Once his INR is 2-3 for 4 weeks we will consider doing a DCCV to determine if he feels better in SR---> endoscopy 9/25, holding 5 days and bridging  []   [x]       Missed Dose denies   []   [x]       Extra Dose denies  [x]   []       Change in medications Since EGD pt taking sucralfate QID x 30 d, pantoprazole, and fluconazole x 20 d ---> states finished fluconazole Monday, sees doctor Friday to see if needs more---> pantoprazole extended, finished fluconazole --> finished sucralfate a while ago. No other changes in meds ---> inc crestor --> will be on pantoprazole for 2 months possibly longer---> denies       []   [x]       Change in health/diet/appetite - cabbage, greens, broccoli, brussel sprouts. Is going to assess consistent amount in next few weeks---> no vit k recently  --> had a can of greens and one other serving of greens.  Doesn't plan to have as much as this week ---> no greens this week---> one can of mixed

## 2023-10-24 ENCOUNTER — ANTI-COAG VISIT (OUTPATIENT)
Dept: PHARMACY | Age: 78
End: 2023-10-24
Payer: COMMERCIAL

## 2023-10-24 DIAGNOSIS — I48.91 ATRIAL FIBRILLATION, UNSPECIFIED TYPE (HCC): Primary | ICD-10-CM

## 2023-10-24 LAB — INTERNATIONAL NORMALIZATION RATIO, POC: 3.8

## 2023-10-24 PROCEDURE — 99212 OFFICE O/P EST SF 10 MIN: CPT

## 2023-10-24 PROCEDURE — 85610 PROTHROMBIN TIME: CPT

## 2023-10-24 NOTE — PROGRESS NOTES
Mr. Marcelo Stubbs is a 70 y.o. y/o male with history of Afib who presents today for anticoagulation monitoring and adjustment. Pertinent PMH: HTN, DVT, CAD, factor V leiden, gastric bypass   Per cardio 7/2023:  Discussed changing to Eliquis once his GI issues have been resolved. Hx of GI bleed. Pt pt has been on warfarin for many years prior to clinic managing   Patient Reported Findings:  Yes     No  [x]   []       Patient verifies current dosing regimen as listed takes warfarin 4 mg qd. Pt takes 6 mg on sun, mon, tues and thurs and 4 mg all other days of the week---> confirmed dose    []   [x]       S/S bleeding/bruising/swelling/SOB -does have bruising from shots---> denies     []   [x]       Blood in urine or stool denies   [x]   []       Procedures scheduled in the future at this time Pulled a few teeth recently. Did not hold warfarin. Had endoscopy on 6/30, held warfarin and bridged with lovenox. Was found to have ulcers so taking meds. Will need to have repeat endoscopy in near future--> Once his INR is 2-3 for 4 weeks we will consider doing a DCCV to determine if he feels better in SR---> endoscopy 9/25, holding 5 days and bridging  []   [x]       Missed Dose denies   []   [x]       Extra Dose denies  [x]   []       Change in medications Since EGD pt taking sucralfate QID x 30 d, pantoprazole, and fluconazole x 20 d ---> states finished fluconazole Monday, sees doctor Friday to see if needs more---> pantoprazole extended, finished fluconazole --> finished sucralfate a while ago. No other changes in meds ---> inc crestor --> will be on pantoprazole for 2 months possibly longer---> tylenol once        []   [x]       Change in health/diet/appetite - cabbage, greens, broccoli, brussel sprouts. Is going to assess consistent amount in next few weeks---> no vit k recently  --> had a can of greens and one other serving of greens.  Doesn't plan to have as much as this week ---> no greens this week---> one can of

## 2023-10-26 ENCOUNTER — TELEPHONE (OUTPATIENT)
Dept: CARDIOLOGY CLINIC | Age: 78
End: 2023-10-26

## 2023-10-26 NOTE — TELEPHONE ENCOUNTER
Called Dr. Brissa Iverson office again and asked them to transfer Patient to our clinic. Spoke with the Device clinic and they stated they would transfer him.  Thanks

## 2023-11-07 ENCOUNTER — ANTI-COAG VISIT (OUTPATIENT)
Dept: PHARMACY | Age: 78
End: 2023-11-07
Payer: MEDICARE

## 2023-11-07 DIAGNOSIS — I48.91 ATRIAL FIBRILLATION, UNSPECIFIED TYPE (HCC): Primary | ICD-10-CM

## 2023-11-07 LAB — INTERNATIONAL NORMALIZATION RATIO, POC: 2.1

## 2023-11-07 PROCEDURE — 85610 PROTHROMBIN TIME: CPT

## 2023-11-07 PROCEDURE — 99211 OFF/OP EST MAY X REQ PHY/QHP: CPT

## 2023-11-07 NOTE — PROGRESS NOTES
Mr. Tatianna Albert is a 70 y.o. y/o male with history of Afib who presents today for anticoagulation monitoring and adjustment. Pertinent PMH: HTN, DVT, CAD, factor V leiden, gastric bypass   Per cardio 7/2023:  Discussed changing to Eliquis once his GI issues have been resolved. Hx of GI bleed. Pt pt has been on warfarin for many years prior to clinic managing   Patient Reported Findings:  Yes     No  [x]   []       Patient verifies current dosing regimen as listed takes warfarin 4 mg qd. Pt takes 6 mg on sun, mon, tues and thurs and 4 mg all other days of the week---> confirmed dose    []   [x]       S/S bleeding/bruising/swelling/SOB -does have bruising from shots---> denies     []   [x]       Blood in urine or stool denies   [x]   []       Procedures scheduled in the future at this time Pulled a few teeth recently. Did not hold warfarin. Had endoscopy on 6/30, held warfarin and bridged with lovenox. Was found to have ulcers so taking meds. Will need to have repeat endoscopy in near future--> Once his INR is 2-3 for 4 weeks we will consider doing a DCCV to determine if he feels better in SR---> endoscopy 9/25, holding 5 days and bridging---> denies   []   [x]       Missed Dose denies   []   [x]       Extra Dose denies  [x]   []       Change in medications Since EGD pt taking sucralfate QID x 30 d, pantoprazole, and fluconazole x 20 d ---> states finished fluconazole Monday, sees doctor Friday to see if needs more---> pantoprazole extended, finished fluconazole --> finished sucralfate a while ago. No other changes in meds ---> inc crestor --> will be on pantoprazole for 2 months possibly longer---> tylenol once---> finishing protonix BID then dropping to QD        []   [x]       Change in health/diet/appetite - cabbage, greens, broccoli, brussel sprouts. Is going to assess consistent amount in next few weeks---> no vit k recently  --> had a can of greens and one other serving of greens.  Doesn't plan to have as

## 2023-11-28 ENCOUNTER — ANTI-COAG VISIT (OUTPATIENT)
Dept: PHARMACY | Age: 78
End: 2023-11-28
Payer: MEDICARE

## 2023-11-28 DIAGNOSIS — I48.91 ATRIAL FIBRILLATION, UNSPECIFIED TYPE (HCC): Primary | ICD-10-CM

## 2023-11-28 LAB — INTERNATIONAL NORMALIZATION RATIO, POC: 4.3

## 2023-11-28 PROCEDURE — 99212 OFFICE O/P EST SF 10 MIN: CPT

## 2023-11-28 PROCEDURE — 85610 PROTHROMBIN TIME: CPT

## 2023-11-28 NOTE — PROGRESS NOTES
Mr. Chepe Charles is a 70 y.o. y/o male with history of Afib who presents today for anticoagulation monitoring and adjustment. Pertinent PMH: HTN, DVT, CAD, factor V leiden, gastric bypass   Per cardio 7/2023:  Discussed changing to Eliquis once his GI issues have been resolved. Hx of GI bleed. Pt pt has been on warfarin for many years prior to clinic managing   Patient Reported Findings:  Yes     No  [x]   []       Patient verifies current dosing regimen as listed takes warfarin 4 mg qd. Pt takes 6 mg on sun, mon, tues and thurs and 4 mg all other days of the week---> confirmed dose    []   [x]       S/S bleeding/bruising/swelling/SOB -does have bruising from shots---> did have some nosebleeds recently   []   [x]       Blood in urine or stool denies   [x]   []       Procedures scheduled in the future at this time Pulled a few teeth recently. Did not hold warfarin. Had endoscopy on 6/30, held warfarin and bridged with lovenox. Was found to have ulcers so taking meds. Will need to have repeat endoscopy in near future--> Once his INR is 2-3 for 4 weeks we will consider doing a DCCV to determine if he feels better in SR---> endoscopy 9/25, holding 5 days and bridging---> denies   []   [x]       Missed Dose denies   []   [x]       Extra Dose denies  [x]   []       Change in medications Since EGD pt taking sucralfate QID x 30 d, pantoprazole, and fluconazole x 20 d ---> states finished fluconazole Monday, sees doctor Friday to see if needs more---> pantoprazole extended, finished fluconazole --> finished sucralfate a while ago. No other changes in meds ---> inc crestor --> will be on pantoprazole for 2 months possibly longer---> tylenol once---> finishing protonix BID then dropping to QD---> tylenol twice         []   [x]       Change in health/diet/appetite - cabbage, greens, broccoli, brussel sprouts.  Is going to assess consistent amount in next few weeks---> no vit k recently  --> had a can of greens and one

## 2023-12-08 PROCEDURE — 93294 REM INTERROG EVL PM/LDLS PM: CPT | Performed by: INTERNAL MEDICINE

## 2023-12-08 PROCEDURE — 93296 REM INTERROG EVL PM/IDS: CPT | Performed by: INTERNAL MEDICINE

## 2023-12-12 ENCOUNTER — ANTI-COAG VISIT (OUTPATIENT)
Dept: PHARMACY | Age: 78
End: 2023-12-12
Payer: MEDICARE

## 2023-12-12 DIAGNOSIS — I48.91 ATRIAL FIBRILLATION, UNSPECIFIED TYPE (HCC): Primary | ICD-10-CM

## 2023-12-12 LAB — INTERNATIONAL NORMALIZATION RATIO, POC: 2

## 2023-12-12 PROCEDURE — 99211 OFF/OP EST MAY X REQ PHY/QHP: CPT

## 2023-12-12 PROCEDURE — 85610 PROTHROMBIN TIME: CPT

## 2024-01-02 ENCOUNTER — ANTI-COAG VISIT (OUTPATIENT)
Dept: PHARMACY | Age: 79
End: 2024-01-02
Payer: COMMERCIAL

## 2024-01-02 DIAGNOSIS — I48.91 ATRIAL FIBRILLATION, UNSPECIFIED TYPE (HCC): Primary | ICD-10-CM

## 2024-01-02 LAB — INTERNATIONAL NORMALIZATION RATIO, POC: 2.4

## 2024-01-02 PROCEDURE — 85610 PROTHROMBIN TIME: CPT

## 2024-01-02 PROCEDURE — 99211 OFF/OP EST MAY X REQ PHY/QHP: CPT

## 2024-01-02 RX ORDER — LISINOPRIL 10 MG/1
5 TABLET ORAL DAILY
COMMUNITY

## 2024-01-19 DIAGNOSIS — I48.91 ATRIAL FIBRILLATION, UNSPECIFIED TYPE (HCC): Primary | ICD-10-CM

## 2024-01-19 RX ORDER — WARFARIN SODIUM 4 MG/1
TABLET ORAL
Qty: 122 TABLET | Refills: 3 | Status: SHIPPED | OUTPATIENT
Start: 2024-01-19

## 2024-01-30 ENCOUNTER — ANTI-COAG VISIT (OUTPATIENT)
Dept: PHARMACY | Age: 79
End: 2024-01-30
Payer: COMMERCIAL

## 2024-01-30 DIAGNOSIS — I48.91 ATRIAL FIBRILLATION, UNSPECIFIED TYPE (HCC): Primary | ICD-10-CM

## 2024-01-30 LAB — INTERNATIONAL NORMALIZATION RATIO, POC: 2.2

## 2024-01-30 PROCEDURE — 85610 PROTHROMBIN TIME: CPT

## 2024-01-30 PROCEDURE — 99211 OFF/OP EST MAY X REQ PHY/QHP: CPT

## 2024-01-30 NOTE — PROGRESS NOTES
For Pharmacy Admin Tracking Only    Total # of Interventions Recommended: 0  Total # of Interventions Accepted: 0  Time Spent (min): 15

## 2024-02-07 ENCOUNTER — TELEPHONE (OUTPATIENT)
Dept: PHARMACY | Age: 79
End: 2024-02-07

## 2024-02-07 NOTE — TELEPHONE ENCOUNTER
----- Message from Katherin Rollins sent at 2/7/2024 12:47 PM EST -----  Regarding: upcoming surgery  Contact: Nancy Elliott [facility] for [facility], letting her know that patient's surgery has been scheduled for 3/4. States the surgeon is contacting Dr. Modi to get clearance for the procedure. [phone]

## 2024-02-07 NOTE — TELEPHONE ENCOUNTER
Returned call to pt wife. Noted about upcoming procedure. Will await clearance to hold from cardiology then will be able to dose patient for procedure at next appt in clinic

## 2024-02-21 NOTE — PROGRESS NOTES
[facility]   Electrophysiology Follow Up  Date: 2/21/2024    CC: ***  HPI: Manish Elliott is a 78 y.o. male with a PMH of HTN, DVT, sss, CAD s/p PCI to LAD 2002 and DVT    S/p Gen change 1/5/2022    ECG 6/30/2023 showed Esophageal stricture, esophagitis with ulceration, hiatal hernia, anastomotic stricture and anastomotic ulcer.     Interval History:      Assessment and plan:   SSS   -s/p [facility] [place] dual chamber PPM, S/p Gen change 1/5/2022   -Interrogation today shows:*** years remaining, AP ***% ,  ***%,  ***% AT/AF burden per device interrogation today, Underlying ***, presenting ***      Permanent Atrial Fibrillation   -ECG today shows ***   -He appears to have been in atrial fibrillation since 2017   -Continue lopressor 100 mg BID    -Patient has a [id number] Score of at least 4(age1, HTN, CAD) continue coumadin. 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 Managed by his PCP. His INR has been uncontrolled at times. Discussed changing to Eliquis once his GI issues have been resolved. Hx of GI bleed. ECG 6/30/2023 showed Esophageal stricture, esophagitis with ulceration, hiatal hernia, anastomotic stricture and anastomotic ulcer.    -Treatment options including cardioversion, rate control strategy, antiarrhythmics, anticoagulation and possible ablation were discussed with patient. Risks, benefits and alternative of each treatment options were explained. All questions answered   -His INR has been low for the past month. Once his INR is 2-3 for 4 weeks we will consider doing a DCCV to determine if he feels better in SR    Venous Insufficiency   -Managed by    -BLE edema    -PRN lasix     DVT/Factor V Leiden    -h/o RLE DVT as a child   -Continue coumadin, will need lifelong anticoagulation d/t diagnosis of factor V Leiden     CAD   -s/p [facility] 8/2002 Successful mid left anterior descending stenting.     -Continue medical therapy with BB and statin   -Order placed for Lexiscan as

## 2024-02-21 NOTE — PROGRESS NOTES
[facility]   Electrophysiology Follow Up  Date: 2/22/2024    CC: atrial fibrillation  HPI: Manish Elliott is a 78 y.o. male with a PMH of HTN, DVT, sss, CAD s/p PCI to LAD 2002 and DVT    S/p Gen change 1/5/2022    ECG 6/30/2023 showed Esophageal stricture, esophagitis with ulceration, hiatal hernia, anastomotic stricture and anastomotic ulcer.     Interval History: Manish  presents today in follow up.  He states his GI issues are resolved.  He is feeling well from a cardiac perspective.  He states his INR is \" good \" now.  His wife states he has occasional nose bleeds       Assessment and plan:   SSS   -s/p [place] dual chamber PPM, S/p Gen change 1/5/2022   -Interrogation today shows:9.9  years remaining,   20 %,  device interrogation today, Underlying AFVS , presenting AFVP       Permanent Atrial Fibrillation   -ECG today shows atrial fibrillation V paced    -He appears to have been in atrial fibrillation since 2017   -Continue lopressor 100 mg BID    -Patient has a [id number] Score of at least 4(age1, HTN, CAD) continue coumadin. 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 Managed by his PCP. His INR 2.2 01/2024    -Treatment options including cardioversion, rate control strategy, antiarrhythmics, anticoagulation and possible ablation were discussed with patient. Risks, benefits and alternative of each treatment options were explained. All questions answered   - he feels well and is not interested in cardioversion at this time.   Will continue with rate control strategy.      Venous Insufficiency   -Managed by    -BLE edema    -PRN lasix     DVT/Factor V Leiden    -h/o RLE DVT as a child   -Continue coumadin, will need lifelong anticoagulation d/t diagnosis of factor V Leiden     CAD   -s/p [facility] 8/2002 Successful mid left anterior descending stenting.     -Continue medical therapy with BB and statin   -Order placed for Lexiscan as he has not had ischemic evaluation in

## 2024-02-22 ENCOUNTER — OFFICE VISIT (OUTPATIENT)
Dept: CARDIOLOGY CLINIC | Age: 79
End: 2024-02-22
Payer: MEDICARE

## 2024-02-22 ENCOUNTER — NURSE ONLY (OUTPATIENT)
Dept: CARDIOLOGY CLINIC | Age: 79
End: 2024-02-22

## 2024-02-22 VITALS
BODY MASS INDEX: 28.85 KG/M2 | OXYGEN SATURATION: 99 % | WEIGHT: 213 LBS | SYSTOLIC BLOOD PRESSURE: 118 MMHG | DIASTOLIC BLOOD PRESSURE: 70 MMHG | HEIGHT: 72 IN | HEART RATE: 65 BPM

## 2024-02-22 DIAGNOSIS — I48.11 LONGSTANDING PERSISTENT ATRIAL FIBRILLATION (HCC): ICD-10-CM

## 2024-02-22 DIAGNOSIS — Z95.0 PRESENCE OF CARDIAC PACEMAKER: Primary | ICD-10-CM

## 2024-02-22 DIAGNOSIS — I10 BENIGN ESSENTIAL HTN: ICD-10-CM

## 2024-02-22 DIAGNOSIS — I49.5 SSS (SICK SINUS SYNDROME) (HCC): ICD-10-CM

## 2024-02-22 DIAGNOSIS — I48.21 PERMANENT ATRIAL FIBRILLATION (HCC): Primary | ICD-10-CM

## 2024-02-22 PROCEDURE — 99214 OFFICE O/P EST MOD 30 MIN: CPT | Performed by: INTERNAL MEDICINE

## 2024-02-22 PROCEDURE — 93000 ELECTROCARDIOGRAM COMPLETE: CPT | Performed by: INTERNAL MEDICINE

## 2024-02-22 PROCEDURE — 3078F DIAST BP <80 MM HG: CPT | Performed by: INTERNAL MEDICINE

## 2024-02-22 PROCEDURE — 1123F ACP DISCUSS/DSCN MKR DOCD: CPT | Performed by: INTERNAL MEDICINE

## 2024-02-22 PROCEDURE — 3074F SYST BP LT 130 MM HG: CPT | Performed by: INTERNAL MEDICINE

## 2024-02-26 ENCOUNTER — TELEPHONE (OUTPATIENT)
Dept: CARDIOLOGY CLINIC | Age: 79
End: 2024-02-26

## 2024-02-26 DIAGNOSIS — I48.91 ATRIAL FIBRILLATION, UNSPECIFIED TYPE (HCC): ICD-10-CM

## 2024-02-26 DIAGNOSIS — R94.31 ABNORMAL ECG: ICD-10-CM

## 2024-02-26 DIAGNOSIS — I10 ESSENTIAL HYPERTENSION: Primary | ICD-10-CM

## 2024-02-26 NOTE — TELEPHONE ENCOUNTER
CARDIAC CLEARANCE     What type of procedure are you having?  Hernia Repair  Incarcerated inguinal with ambit pain ball and possible mesh    Which physician is performing your procedure?  Dr Oskar Thomas     When is your procedure scheduled for?  3/4/24     Where are you having this procedure?   [facility]    Are you taking Blood Thinners? Warfarin    If so what? (Name/dose/frequesncy)     Does the surgeon want you to stop your blood thinner?  If so for how long? Yes not sure    Phone Number and Contact Name for Physicians office: [phone] Mercedes    Fax number to send information: [phone]

## 2024-02-26 NOTE — TELEPHONE ENCOUNTER
Nancy returned call, I relayed message concerning Lexiscan and provided central scheduling phone number.

## 2024-02-26 NOTE — TELEPHONE ENCOUNTER
Mercedes [phone] with Dr Thomas's office needs to know if pt can stop warfarin before the procedure.  They normally would like 3-5 days prior.    Please advise if pt needs bridged with Lovenox or if he needs to be bridged.    Please advise.

## 2024-02-26 NOTE — TELEPHONE ENCOUNTER
Per PHI left detailed message on pts voicemail. Also left number to call central scheduling to schedule lexiscan

## 2024-02-26 NOTE — TELEPHONE ENCOUNTER
We cannot provide clearance without a stress test. His INR's are managed by PCP he had an unprovoked DVT would require bridging

## 2024-02-27 ENCOUNTER — ANTI-COAG VISIT (OUTPATIENT)
Dept: PHARMACY | Age: 79
End: 2024-02-27
Payer: MEDICARE

## 2024-02-27 ENCOUNTER — TELEPHONE (OUTPATIENT)
Dept: CARDIOLOGY CLINIC | Age: 79
End: 2024-02-27

## 2024-02-27 DIAGNOSIS — I48.91 ATRIAL FIBRILLATION, UNSPECIFIED TYPE (HCC): Primary | ICD-10-CM

## 2024-02-27 LAB — INTERNATIONAL NORMALIZATION RATIO, POC: 4.1

## 2024-02-27 PROCEDURE — 85610 PROTHROMBIN TIME: CPT

## 2024-02-27 PROCEDURE — 99212 OFFICE O/P EST SF 10 MIN: CPT

## 2024-02-27 NOTE — PROGRESS NOTES
other days.  Encouraged to maintain a consistency of vegetables/salads.   Recheck INR in 10 days, 3/8- can rs if not bridging (states shots are very expensive)   Patient consent signed 23    Referring is Dr. Modi cardio   INR (no units)   Date Value   2023 1.50   2017 1.13     INR,(POC) (no units)   Date Value   2024 2.2   2024 2.4   2023 2.0   2023 4.3     For Pharmacy Admin Tracking Only    Intervention Detail: Adherence Monitorin and Dose Adjustment: 1, reason: Therapy Optimization  Total # of Interventions Recommended: 2  Total # of Interventions Accepted: 2  Time Spent (min): 15

## 2024-02-27 NOTE — TELEPHONE ENCOUNTER
CARDIAC CLEARANCE     What type of procedure are you having? Hernia    Which physician is performing your procedure? Dr. Thomas    When is your procedure scheduled for? 3/4/24    Where are you having this procedure?  [facility]    Are you taking Blood Thinners?  yes   If so what? (Name/dose/frequesncy)  Warfarin     Does the surgeon want you to stop your blood thinner?  If so for how long?  Does Dr. Modi want patient to stop Warfarin and do bridging w/Lovenox shots?  If so, how long?      Phone Number and Contact Name for Physicians office:  Mercedes/TEJAS [phone]    Fax number to send information: [phone]      Please advise patient on what to do.  Thank you.

## 2024-02-28 NOTE — TELEPHONE ENCOUNTER
Called and spoke to Dr Smith office and gave message below and Mercedes verbalized understanding and said that she will call the patient and let him know.

## 2024-02-29 ENCOUNTER — TELEPHONE (OUTPATIENT)
Dept: PHARMACY | Age: 79
End: 2024-02-29

## 2024-02-29 DIAGNOSIS — I48.91 ATRIAL FIBRILLATION, UNSPECIFIED TYPE (HCC): Primary | ICD-10-CM

## 2024-02-29 NOTE — TELEPHONE ENCOUNTER
Called patient to follow-up on procedure clearance and instructions. Procedure still scheduled for Monday 3/4 but stress test is scheduled for Friday 3/1 to provide clearance. Per notes, doctor will contact patient with holding instructions after cleared. Will need to know if 3 days of holding is enough and if has to bridge. Asked patient/s.o. to call us.     Melissa Castillo, PharmD, [facility]    For Pharmacy Admin Tracking Only    Intervention Detail: Adherence Monitorin  Total # of Interventions Recommended: 1  Total # of Interventions Accepted: 1  Time Spent (min): 15

## 2024-03-01 ENCOUNTER — HOSPITAL ENCOUNTER (OUTPATIENT)
Dept: NON INVASIVE DIAGNOSTICS | Age: 79
Discharge: HOME | End: 2024-03-01
Payer: MEDICARE

## 2024-03-01 DIAGNOSIS — I48.91 ATRIAL FIBRILLATION, UNSPECIFIED TYPE (HCC): ICD-10-CM

## 2024-03-01 DIAGNOSIS — R94.31 ABNORMAL ECG: ICD-10-CM

## 2024-03-01 PROCEDURE — 3430000000 HC RX DIAGNOSTIC RADIOPHARMACEUTICAL: Performed by: INTERNAL MEDICINE

## 2024-03-01 PROCEDURE — 6360000002 HC RX W HCPCS: Performed by: INTERNAL MEDICINE

## 2024-03-01 PROCEDURE — A9502 TC99M TETROFOSMIN: HCPCS | Performed by: INTERNAL MEDICINE

## 2024-03-01 PROCEDURE — 78452 HT MUSCLE IMAGE SPECT MULT: CPT

## 2024-03-01 PROCEDURE — 93017 CV STRESS TEST TRACING ONLY: CPT | Performed by: INTERNAL MEDICINE

## 2024-03-01 RX ORDER — ENOXAPARIN SODIUM 100 MG/ML
100 INJECTION SUBCUTANEOUS 2 TIMES DAILY
Qty: 10 ML | Refills: 1 | Status: SHIPPED | OUTPATIENT
Start: 2024-03-01

## 2024-03-01 RX ORDER — REGADENOSON 0.08 MG/ML
0.4 INJECTION, SOLUTION INTRAVENOUS
OUTPATIENT
Start: 2024-03-01

## 2024-03-01 RX ORDER — REGADENOSON 0.08 MG/ML
0.4 INJECTION, SOLUTION INTRAVENOUS
Status: COMPLETED | OUTPATIENT
Start: 2024-03-01 | End: 2024-03-01

## 2024-03-01 RX ADMIN — TETROFOSMIN 10 MILLICURIE: 1.38 INJECTION, POWDER, LYOPHILIZED, FOR SOLUTION INTRAVENOUS at 07:52

## 2024-03-01 RX ADMIN — TETROFOSMIN 30 MILLICURIE: 1.38 INJECTION, POWDER, LYOPHILIZED, FOR SOLUTION INTRAVENOUS at 09:14

## 2024-03-01 RX ADMIN — REGADENOSON 0.4 MG: 0.08 INJECTION, SOLUTION INTRAVENOUS at 09:04

## 2024-03-01 NOTE — TELEPHONE ENCOUNTER
Per [facility] - stop warfarin today - start  lovenox on Sunday 1 mg /kg  continue  post procedure until therapeutic per [facility]. Discussed with Margaux in [facility] clinic she will order and discuss with patient.     Spoke with patient updated.

## 2024-03-01 NOTE — PROGRESS NOTES
Instructed on Lexiscan Stress Test Procedure including possible side effects/ adverse reactions. Patient verbalizes  understanding and denies having any questions .See Epic Cardiology

## 2024-03-01 NOTE — TELEPHONE ENCOUNTER
Received call back from Elizabeth with Dr. Modi's office. Pt cleared to hold warfarin starting tonight. They would like pt bridged with lovenox beginning Sun am. Discussed would be 1 inj prior to surgery then bridging post op. They verified instructions.

## 2024-03-01 NOTE — TELEPHONE ENCOUNTER
Margaux with coumadin clinic states pt had stress test today and asking for clearance and warfarin directions. Please call pt to advise.

## 2024-03-08 ENCOUNTER — ANTI-COAG VISIT (OUTPATIENT)
Dept: PHARMACY | Age: 79
End: 2024-03-08
Payer: MEDICARE

## 2024-03-08 DIAGNOSIS — I48.91 ATRIAL FIBRILLATION, UNSPECIFIED TYPE (HCC): Primary | ICD-10-CM

## 2024-03-08 LAB — INTERNATIONAL NORMALIZATION RATIO, POC: 1.2

## 2024-03-08 PROCEDURE — 99212 OFFICE O/P EST SF 10 MIN: CPT

## 2024-03-08 PROCEDURE — 85610 PROTHROMBIN TIME: CPT

## 2024-03-08 NOTE — PROGRESS NOTES
Mr. Manish Elliott is a 78 y.o. y/o male with history of Afib who presents today for anticoagulation monitoring and adjustment.    Pertinent PMH: HTN, DVT, CAD, factor V leiden, gastric bypass   Per cardio 7/2023:  Discussed changing to Eliquis once his GI issues have been resolved. Hx of GI bleed.  Pt pt has been on warfarin for many years prior to clinic managing   Patient Reported Findings:  Yes     No  [x]   []       Patient verifies current dosing regimen as listed takes warfarin 4 mg qd. Pt takes 6 mg on sun, mon, tues and thurs and 4 mg all other days of the week---> confirmed dose    []   [x]       S/S bleeding/bruising/swelling/SOB -does have bruising from shots---> did have some nosebleeds recently---> did have nosebleed---> denies      []   [x]       Blood in urine or stool denies   [x]   []       Procedures scheduled in the future at this time Pulled a few teeth recently. Did not hold warfarin. Had endoscopy on 6/30, held warfarin and bridged with lovenox. Was found to have ulcers so taking meds. Will need to have repeat endoscopy in near future--> Once his INR is 2-3 for 4 weeks we will consider doing a DCCV to determine if he feels better in SR---> endoscopy 9/25, holding 5 days and bridging---> might have hernia repair in future---> 3/4, awaiting clearance and instructions---> none      []   [x]       Missed Dose denies ---> held 4 days total   []   [x]       Extra Dose denies  []   [x]       Change in medications Since EGD pt taking sucralfate QID x 30 d, pantoprazole, and fluconazole x 20 d ---> states finished fluconazole Monday, sees doctor Friday to see if needs more---> pantoprazole extended, finished fluconazole --> finished sucralfate a while ago. No other changes in meds ---> inc crestor --> will be on pantoprazole for 2 months possibly longer---> tylenol once---> finishing protonix BID then dropping to QD---> tylenol twice---> restarted lisinopril --> benadryl ---> pain ball coming out today

## 2024-03-11 ENCOUNTER — TELEPHONE (OUTPATIENT)
Dept: PHARMACY | Age: 79
End: 2024-03-11

## 2024-03-11 NOTE — TELEPHONE ENCOUNTER
Pt in hospital 3/10-3/11 for hematoma that developed after removal of q-pump on Sun. INR 1.6 today. Hematoma on the right inguinal area will take some time for it to reabsorb. Pt was instructed to hold warfarin and lovenox until Wed.       Called and spoke to Nancy. Dr. Thomas states for pt to hold warfarin and lovenox x 2 days and resume on Wed. Advised for patient to take 8 mg on Wed and Thurs then 6 mg daily. Resume lovenox as well. R/s pt to RTC on Mon 3/18.

## 2024-03-11 NOTE — TELEPHONE ENCOUNTER
----- Message from Tyler Martinez sent at 3/11/2024  2:59 PM EDT -----  Nancy calling for pt, he's currently getting d/c'd from [facility],he had complications from surgery on Monday. Pt has a huge hematoma they want him to hold warfarin & lovenox until Wednesday. Would like to s/w pharmacist, pt has a CC appt tomorrow wants to know if he needs to come in or r/s.  [phone] ( Nancy)

## 2024-03-18 ENCOUNTER — APPOINTMENT (OUTPATIENT)
Dept: PHARMACY | Age: 79
End: 2024-03-18
Payer: MEDICARE

## 2024-03-18 DIAGNOSIS — I48.91 ATRIAL FIBRILLATION, UNSPECIFIED TYPE (HCC): Primary | ICD-10-CM

## 2024-03-18 LAB — INTERNATIONAL NORMALIZATION RATIO, POC: 1.2

## 2024-03-18 PROCEDURE — 85610 PROTHROMBIN TIME: CPT

## 2024-03-18 PROCEDURE — 99212 OFFICE O/P EST SF 10 MIN: CPT

## 2024-03-18 RX ORDER — ENOXAPARIN SODIUM 100 MG/ML
100 INJECTION SUBCUTANEOUS 2 TIMES DAILY
Qty: 10 ML | Refills: 2 | Status: SHIPPED | OUTPATIENT
Start: 2024-03-18

## 2024-03-18 NOTE — PROGRESS NOTES
Mr. Manish Elliott is a 78 y.o. y/o male with history of Afib who presents today for anticoagulation monitoring and adjustment.    Pertinent PMH: HTN, DVT, CAD, factor V leiden, gastric bypass   Per cardio 7/2023:  Discussed changing to Eliquis once his GI issues have been resolved. Hx of GI bleed.  Pt pt has been on warfarin for many years prior to clinic managing   Patient Reported Findings:  Yes     No  [x]   []       Patient verifies current dosing regimen as listed takes warfarin 4 mg qd. Pt takes 6 mg on sun, mon, tues and thurs and 4 mg all other days of the week---> confirmed dose --> pt has been taking 6 mg daily     [x]   []       S/S bleeding/bruising/swelling/SOB -does have bruising from shots---> did have some nosebleeds recently---> did have nosebleed---> has hematoma following procedure   []   [x]       Blood in urine or stool denies   [x]   []       Procedures scheduled in the future at this time Pulled a few teeth recently. Did not hold warfarin. Had endoscopy on 6/30, held warfarin and bridged with lovenox. Was found to have ulcers so taking meds. Will need to have repeat endoscopy in near future--> Once his INR is 2-3 for 4 weeks we will consider doing a DCCV to determine if he feels better in SR---> endoscopy 9/25, holding 5 days and bridging---> might have hernia repair in future---> 3/4, awaiting clearance and instructions---> none      []   [x]       Missed Dose denies   []   [x]       Extra Dose denies  []   [x]       Change in medications Since EGD pt taking sucralfate QID x 30 d, pantoprazole, and fluconazole x 20 d ---> states finished fluconazole Monday, sees doctor Friday to see if needs more---> pantoprazole extended, finished fluconazole --> finished sucralfate a while ago. No other changes in meds ---> inc crestor --> will be on pantoprazole for 2 months possibly longer---> tylenol once---> finishing protonix BID then dropping to QD---> tylenol twice---> restarted lisinopril -->

## 2024-03-19 ENCOUNTER — OFFICE VISIT (OUTPATIENT)
Dept: CARDIOLOGY CLINIC | Age: 79
End: 2024-03-19
Payer: MEDICARE

## 2024-03-19 VITALS
WEIGHT: 212 LBS | OXYGEN SATURATION: 98 % | SYSTOLIC BLOOD PRESSURE: 112 MMHG | DIASTOLIC BLOOD PRESSURE: 78 MMHG | HEART RATE: 68 BPM | BODY MASS INDEX: 28.75 KG/M2

## 2024-03-19 DIAGNOSIS — I87.2 VENOUS INSUFFICIENCY OF BOTH LOWER EXTREMITIES: Primary | ICD-10-CM

## 2024-03-19 PROCEDURE — 1123F ACP DISCUSS/DSCN MKR DOCD: CPT | Performed by: INTERNAL MEDICINE

## 2024-03-19 PROCEDURE — 99213 OFFICE O/P EST LOW 20 MIN: CPT | Performed by: INTERNAL MEDICINE

## 2024-03-19 PROCEDURE — 3074F SYST BP LT 130 MM HG: CPT | Performed by: INTERNAL MEDICINE

## 2024-03-19 PROCEDURE — 3078F DIAST BP <80 MM HG: CPT | Performed by: INTERNAL MEDICINE

## 2024-03-19 RX ORDER — AMOXICILLIN AND CLAVULANATE POTASSIUM 875; 125 MG/1; MG/1
1 TABLET, FILM COATED ORAL 2 TIMES DAILY
COMMUNITY
Start: 2024-03-15 | End: 2024-03-22

## 2024-03-19 RX ORDER — DIPHENHYDRAMINE HCL 25 MG
25 CAPSULE ORAL EVERY 6 HOURS PRN
COMMUNITY

## 2024-03-19 NOTE — PROGRESS NOTES
Interventional Cardiology Consultation     Manish Elliott  1945    PCP: Balwinder Ash APRN - CNP  Referring Physician: Dr. Modi  Reason for Referral: BLE edema  Chief Complaint: \"I am okay\"  Chief Complaint   Patient presents with    Follow-up     No cc       Subjective:   History of Present Illness: The patient is 78 y.o. male with a past medical history significant for hypertension, lupus, sleep apnea, CAD with PCI, sick sinus syndrome s/p dual chamber pacemaker, gastric bypass, atrial fibrillation, postoperative DVT and mild carotid artery stenosis. He also has a history of trauma to the left lateral calf. He quit smoking >40 yrs ago. Patient underwent procedure with Dr. Roldan in 2017 for left anterior tibial ulceration. Previously followed at [facility]. Patient presents today for follow up. Reports overall he is feeling okay. Reports recent hernia surgery which has been causing him issues since, not as active. Still with complaints of mild lower extremity edema and discoloration. Patient does wear compression stockings, however unable to get them on since his surgery.       Past Medical History:   Diagnosis Date    ALIYA (acute kidney injury) (HCC)     Allergic rhinitis     Atrial fibrillation (HCC)     CAD (coronary artery disease)     History of lupus anticoagulant disorder     Hypertension     Intestinal obstruction (HCC)     Obesity     Osteoarthritis     Sleep apnea      Past Surgical History:   Procedure Laterality Date    CORONARY ANGIOPLASTY      PACEMAKER INSERTION      ANTONY-EN-Y GASTRIC BYPASS      SHOULDER SURGERY      TOTAL KNEE ARTHROPLASTY       Family History   Problem Relation Age of Onset    High Blood Pressure Mother     Heart Attack Father     Heart Disease Sister      Social History     Tobacco Use    Smoking status: Former    Smokeless tobacco: Never   Vaping Use    Vaping Use: Never used   Substance Use Topics    Alcohol use: No

## 2024-03-22 ENCOUNTER — ANTI-COAG VISIT (OUTPATIENT)
Dept: PHARMACY | Age: 79
End: 2024-03-22
Payer: MEDICARE

## 2024-03-22 DIAGNOSIS — I48.91 ATRIAL FIBRILLATION, UNSPECIFIED TYPE (HCC): Primary | ICD-10-CM

## 2024-03-22 LAB — INTERNATIONAL NORMALIZATION RATIO, POC: 1.8

## 2024-03-22 PROCEDURE — 85610 PROTHROMBIN TIME: CPT

## 2024-03-22 PROCEDURE — 99212 OFFICE O/P EST SF 10 MIN: CPT

## 2024-03-22 NOTE — PROGRESS NOTES
warfarin on 3/13. Went back to ED 3/15 and was d/c on augmentin x 1 week (finishing 3/22). Pt states ran out of lovenox 2 days ago     INR is 1.8 today, had procedure with bleeding complications so held additional days, bridging still and was boosted Mon and Tues. Has 4 shots left. States that has follow up next Thurs to discuss bleeding but that surgeon stated that the hematoma had to bleed out somewhere so that's what it was doing. States that they have had him on 6mg daily and have not been back to 4mg doses at all dt wanting to get off shots. Concerned about getting more shots and worsening bleeding so will have pt just finish the 4 they have at home.   Take 6mg daily until Mon  Finish lovenox shots that you have at home.   Contact surgeon if bleeding persists.   Encouraged to maintain a consistency of vegetables/salads.   Recheck INR on Mon 3/25  Patient consent signed 23    Referring is Dr. Modi cardio   INR (no units)   Date Value   2023 1.50   2017 1.13     INR,(POC) (no units)   Date Value   2024 1.2   2024 1.2   2024 4.1   2024 2.2     For Pharmacy Admin Tracking Only    Intervention Detail: Adherence Monitorin and Dose Adjustment: 1, reason: Therapy Optimization  Total # of Interventions Recommended: 2  Total # of Interventions Accepted: 2  Time Spent (min): 15

## 2024-03-25 ENCOUNTER — ANTI-COAG VISIT (OUTPATIENT)
Dept: PHARMACY | Age: 79
End: 2024-03-25
Payer: MEDICARE

## 2024-03-25 DIAGNOSIS — I48.91 ATRIAL FIBRILLATION, UNSPECIFIED TYPE (HCC): Primary | ICD-10-CM

## 2024-03-25 LAB — INTERNATIONAL NORMALIZATION RATIO, POC: 1.9

## 2024-03-25 PROCEDURE — 99212 OFFICE O/P EST SF 10 MIN: CPT

## 2024-03-25 PROCEDURE — 85610 PROTHROMBIN TIME: CPT

## 2024-03-25 NOTE — PROGRESS NOTES
Mr. Manish Elliott is a 78 y.o. y/o male with history of Afib who presents today for anticoagulation monitoring and adjustment.    Pertinent PMH: HTN, DVT, CAD, factor V leiden, gastric bypass   Per cardio 7/2023:  Discussed changing to Eliquis once his GI issues have been resolved. Hx of GI bleed.  Pt pt has been on warfarin for many years prior to clinic managing   Patient Reported Findings:  Yes     No  [x]   []       Patient verifies current dosing regimen as listed takes warfarin 4 mg qd. Pt takes 6 mg on sun, mon, tues and thurs and 4 mg all other days of the week---> confirmed dose --> pt has been taking 6 mg daily---> confirmed      [x]   []       S/S bleeding/bruising/swelling/SOB -does have bruising from shots---> did have some nosebleeds recently---> did have nosebleed---> has hematoma following procedure---> improving, but still bleeding---> improving    []   [x]       Blood in urine or stool denies   [x]   []       Procedures scheduled in the future at this time Pulled a few teeth recently. Did not hold warfarin. Had endoscopy on 6/30, held warfarin and bridged with lovenox. Was found to have ulcers so taking meds. Will need to have repeat endoscopy in near future--> Once his INR is 2-3 for 4 weeks we will consider doing a DCCV to determine if he feels better in SR---> endoscopy 9/25, holding 5 days and bridging---> might have hernia repair in future---> 3/4, awaiting clearance and instructions---> none      []   [x]       Missed Dose denies   []   [x]       Extra Dose denies  []   [x]       Change in medications Since EGD pt taking sucralfate QID x 30 d, pantoprazole, and fluconazole x 20 d ---> states finished fluconazole Monday, sees doctor Friday to see if needs more---> pantoprazole extended, finished fluconazole --> finished sucralfate a while ago. No other changes in meds ---> inc crestor --> will be on pantoprazole for 2 months possibly longer---> tylenol once---> finishing protonix BID then

## 2024-03-28 ENCOUNTER — ANTI-COAG VISIT (OUTPATIENT)
Dept: PHARMACY | Age: 79
End: 2024-03-28
Payer: MEDICARE

## 2024-03-28 DIAGNOSIS — I48.91 ATRIAL FIBRILLATION, UNSPECIFIED TYPE (HCC): Primary | ICD-10-CM

## 2024-03-28 LAB — INTERNATIONAL NORMALIZATION RATIO, POC: 2.8

## 2024-03-28 PROCEDURE — 85610 PROTHROMBIN TIME: CPT

## 2024-03-28 PROCEDURE — 99211 OFF/OP EST MAY X REQ PHY/QHP: CPT

## 2024-03-28 NOTE — PROGRESS NOTES
Mr. Manish Elliott is a 78 y.o. y/o male with history of Afib who presents today for anticoagulation monitoring and adjustment.    Pertinent PMH: HTN, DVT, CAD, factor V leiden, gastric bypass   Per cardio 7/2023:  Discussed changing to Eliquis once his GI issues have been resolved. Hx of GI bleed.  Pt pt has been on warfarin for many years prior to clinic managing   Patient Reported Findings:  Yes     No  [x]   []       Patient verifies current dosing regimen as listed takes warfarin 4 mg qd. Pt takes 6 mg on sun, mon, tues and thurs and 4 mg all other days of the week---> confirmed dose --> pt has been taking 6 mg daily---> confirmed      [x]   []       S/S bleeding/bruising/swelling/SOB -does have bruising from shots---> did have some nosebleeds recently---> did have nosebleed---> has hematoma following procedure---> improving, but still bleeding---> improving --> small amount of bleeding from surgery    []   [x]       Blood in urine or stool denies   []   [x]       Procedures scheduled in the future at this time Pulled a few teeth recently. Did not hold warfarin. Had endoscopy on 6/30, held warfarin and bridged with lovenox. Was found to have ulcers so taking meds. Will need to have repeat endoscopy in near future--> Once his INR is 2-3 for 4 weeks we will consider doing a DCCV to determine if he feels better in SR---> endoscopy 9/25, holding 5 days and bridging---> might have hernia repair in future---> 3/4, awaiting clearance and instructions---> none      []   [x]       Missed Dose denies   []   [x]       Extra Dose denies  []   [x]       Change in medications  inc crestor --> pain ball coming out today --> has been taking tylenol 1 g qod. On augmentin x 7 d ---> finishing abx today---> denies changes      [x]   []       Change in health/diet/appetite - cabbage, greens, broccoli, brussel sprouts. Is going to assess consistent amount in next few weeks---> no vit k recently  --> had a can of greens and one

## 2024-04-04 ENCOUNTER — ANTI-COAG VISIT (OUTPATIENT)
Dept: PHARMACY | Age: 79
End: 2024-04-04
Payer: MEDICARE

## 2024-04-04 DIAGNOSIS — I48.91 ATRIAL FIBRILLATION, UNSPECIFIED TYPE (HCC): Primary | ICD-10-CM

## 2024-04-04 LAB — INTERNATIONAL NORMALIZATION RATIO, POC: 2.7

## 2024-04-04 PROCEDURE — 85610 PROTHROMBIN TIME: CPT

## 2024-04-04 PROCEDURE — 99211 OFF/OP EST MAY X REQ PHY/QHP: CPT

## 2024-04-04 NOTE — PROGRESS NOTES
Mr. Manish Elliott is a 78 y.o. y/o male with history of Afib who presents today for anticoagulation monitoring and adjustment.    Pertinent PMH: HTN, DVT, CAD, factor V leiden, gastric bypass   Per cardio 7/2023:  Discussed changing to Eliquis once his GI issues have been resolved. Hx of GI bleed.  Pt pt has been on warfarin for many years prior to clinic managing   Patient Reported Findings:  Yes     No  [x]   []       Patient verifies current dosing regimen as listed takes warfarin 4 mg qd. Pt takes 6 mg on sun, mon, tues and thurs and 4 mg all other days of the week---> confirmed dose --> pt has been taking 6 mg daily---> confirmed      [x]   []       S/S bleeding/bruising/swelling/SOB -does have bruising from shots---> did have some nosebleeds recently---> did have nosebleed---> has hematoma following procedure---> improving, but still bleeding---> improving --> small amount of bleeding from surgery---> denies     []   [x]       Blood in urine or stool denies   []   [x]       Procedures scheduled in the future at this time Pulled a few teeth recently. Did not hold warfarin. Had endoscopy on 6/30, held warfarin and bridged with lovenox. Was found to have ulcers so taking meds. Will need to have repeat endoscopy in near future--> Once his INR is 2-3 for 4 weeks we will consider doing a DCCV to determine if he feels better in SR---> endoscopy 9/25, holding 5 days and bridging---> might have hernia repair in future---> 3/4, awaiting clearance and instructions---> none      []   [x]       Missed Dose denies   []   [x]       Extra Dose denies  []   [x]       Change in medications  inc crestor --> pain ball coming out today --> has been taking tylenol 1 g qod. On augmentin x 7 d ---> finishing abx today---> Bactrim for 10 days started 3/28 until 4/7  [x]   []       Change in health/diet/appetite - cabbage, greens, broccoli, brussel sprouts. Is going to assess consistent amount in next few weeks---> no vit k recently

## 2024-04-11 ENCOUNTER — ANTI-COAG VISIT (OUTPATIENT)
Dept: PHARMACY | Age: 79
End: 2024-04-11
Payer: MEDICARE

## 2024-04-11 DIAGNOSIS — I48.91 ATRIAL FIBRILLATION, UNSPECIFIED TYPE (HCC): Primary | ICD-10-CM

## 2024-04-11 LAB — INTERNATIONAL NORMALIZATION RATIO, POC: 2.4

## 2024-04-11 PROCEDURE — 99211 OFF/OP EST MAY X REQ PHY/QHP: CPT

## 2024-04-11 PROCEDURE — 85610 PROTHROMBIN TIME: CPT

## 2024-04-11 NOTE — PROGRESS NOTES
Date Value   07/07/2023 1.50   12/18/2017 1.13     INR,(POC) (no units)   Date Value   04/04/2024 2.7   03/28/2024 2.8   03/25/2024 1.9   03/22/2024 1.8     For Pharmacy Admin Tracking Only    Total # of Interventions Recommended: 0  Total # of Interventions Accepted: 0  Time Spent (min): 15

## 2024-04-25 ENCOUNTER — ANTI-COAG VISIT (OUTPATIENT)
Dept: PHARMACY | Age: 79
End: 2024-04-25
Payer: MEDICARE

## 2024-04-25 DIAGNOSIS — I48.91 ATRIAL FIBRILLATION, UNSPECIFIED TYPE (HCC): Primary | ICD-10-CM

## 2024-04-25 LAB — INTERNATIONAL NORMALIZATION RATIO, POC: 4.6

## 2024-04-25 PROCEDURE — 99211 OFF/OP EST MAY X REQ PHY/QHP: CPT

## 2024-04-25 PROCEDURE — 85610 PROTHROMBIN TIME: CPT

## 2024-04-25 NOTE — PROGRESS NOTES
Mr. Manish Elliott is a 79 y.o. y/o male with history of Afib who presents today for anticoagulation monitoring and adjustment.    Pertinent PMH: HTN, DVT, CAD, factor V leiden, gastric bypass   Per cardio 7/2023:  Discussed changing to Eliquis once his GI issues have been resolved. Hx of GI bleed.  Pt pt has been on warfarin for many years prior to clinic managing   Patient Reported Findings:  Yes     No  [x]   []       Patient verifies current dosing regimen as listed takes warfarin 4 mg qd. Pt takes 6 mg on sun, mon, tues and thurs and 4 mg all other days of the week---> confirmed dose --> pt has been taking 6 mg daily---> confirmed      [x]   []       S/S bleeding/bruising/swelling/SOB -does have bruising from shots---> did have some nosebleeds recently---> did have nosebleed---> has hematoma following procedure---> improving, but still bleeding---> improving --> small amount of bleeding from surgery---> incision is still not fully closed     []   [x]       Blood in urine or stool denies   []   [x]       Procedures scheduled in the future at this time Pulled a few teeth recently. Did not hold warfarin. Had endoscopy on 6/30, held warfarin and bridged with lovenox. Was found to have ulcers so taking meds. Will need to have repeat endoscopy in near future--> Once his INR is 2-3 for 4 weeks we will consider doing a DCCV to determine if he feels better in SR---> endoscopy 9/25, holding 5 days and bridging---> might have hernia repair in future---> 3/4, awaiting clearance and instructions---> none      []   [x]       Missed Dose denies   []   [x]       Extra Dose denies  []   [x]       Change in medications  inc crestor --> pain ball coming out today --> has been taking tylenol 1 g qod. On augmentin x 7 d ---> finishing abx today---> Bactrim for 10 days started 3/28 until 4/7 --> 10 day doxycycline course started 4/18  []   [x]       Change in health/diet/appetite - cabbage, greens, broccoli, brussel sprouts. Is

## 2024-04-30 ENCOUNTER — TELEPHONE (OUTPATIENT)
Dept: CARDIOLOGY CLINIC | Age: 79
End: 2024-04-30

## 2024-04-30 ENCOUNTER — TELEPHONE (OUTPATIENT)
Dept: PHARMACY | Age: 79
End: 2024-04-30

## 2024-04-30 NOTE — TELEPHONE ENCOUNTER
Pt and s/o called clinic stating they had procedure today for wound closure and INR was 4.3.     Pt verified that held dose after last appt and followed dosing instructions. Finished abx on Sun and no other med changes. Patient did vomit twice in the past week but not significant. Has not had any vit k.     Concerned INR remained significantly supratherapeutic despite holding and adjusting dose on Thurs.     Advised for patient to hold dose tonight and tomorrow then decrease weekly dose to 4 mg daily. R/s pt to RTC on Mon 5/6

## 2024-04-30 NOTE — TELEPHONE ENCOUNTER
Nancy, partner called to inform the office that Dr. Thomas saw that the Pt was having non-capturing event and they are wanting someone from the office to call her to discuss this matter.  Thank you

## 2024-04-30 NOTE — TELEPHONE ENCOUNTER
I spoke with pt and his wife and when he was in surgery today at a [facility] facility he had some sort of event. They were unsure how to send a luanne transmission so they are calling the help line on their [employer] device and will be sending a transmission.

## 2024-05-02 ENCOUNTER — TELEPHONE (OUTPATIENT)
Dept: CARDIOLOGY CLINIC | Age: 79
End: 2024-05-02

## 2024-05-02 ENCOUNTER — APPOINTMENT (OUTPATIENT)
Dept: PHARMACY | Age: 79
End: 2024-05-02
Payer: MEDICARE

## 2024-05-02 ENCOUNTER — NURSE ONLY (OUTPATIENT)
Dept: CARDIOLOGY CLINIC | Age: 79
End: 2024-05-02

## 2024-05-02 DIAGNOSIS — I49.5 SSS (SICK SINUS SYNDROME) (HCC): ICD-10-CM

## 2024-05-02 DIAGNOSIS — Z95.0 PRESENCE OF CARDIAC PACEMAKER: Primary | ICD-10-CM

## 2024-05-02 DIAGNOSIS — I48.91 ATRIAL FIBRILLATION, UNSPECIFIED TYPE (HCC): ICD-10-CM

## 2024-05-02 NOTE — TELEPHONE ENCOUNTER
NICOLE Bailey's partner states they went to the surgeon this after noon and the only electrical equipment used was electrical cauterization on his open wound in his abdomen.    Surgeon does not know if a magnet was put on his pace maker or not.

## 2024-05-06 ENCOUNTER — ANTI-COAG VISIT (OUTPATIENT)
Dept: PHARMACY | Age: 79
End: 2024-05-06
Payer: MEDICARE

## 2024-05-06 DIAGNOSIS — I48.91 ATRIAL FIBRILLATION, UNSPECIFIED TYPE (HCC): Primary | ICD-10-CM

## 2024-05-06 LAB — INTERNATIONAL NORMALIZATION RATIO, POC: 1.2

## 2024-05-06 PROCEDURE — 85610 PROTHROMBIN TIME: CPT

## 2024-05-06 PROCEDURE — 99212 OFFICE O/P EST SF 10 MIN: CPT

## 2024-05-06 NOTE — PROGRESS NOTES
Mr. Manish Elliott is a 79 y.o. y/o male with history of Afib who presents today for anticoagulation monitoring and adjustment.    Pertinent PMH: HTN, DVT, CAD, factor V leiden, gastric bypass   Per cardio 7/2023:  Discussed changing to Eliquis once his GI issues have been resolved. Hx of GI bleed.  Pt pt has been on warfarin for many years prior to clinic managing   Patient Reported Findings:  Yes     No  [x]   []       Patient verifies current dosing regimen as listed takes warfarin 4 mg qd. Pt takes 6 mg on sun, mon, tues and thurs and 4 mg all other days of the week---> confirmed dose --> pt has been taking 6 mg daily---> confirmed      [x]   []       S/S bleeding/bruising/swelling/SOB -does have bruising from shots---> did have some nosebleeds recently---> did have nosebleed---> has hematoma following procedure---> improving, but still bleeding---> improving --> small amount of bleeding from surgery---> incision is still not fully closed --> staples are closing and healing     []   [x]       Blood in urine or stool denies   []   [x]       Procedures scheduled in the future at this time Pulled a few teeth recently. Did not hold warfarin. Had endoscopy on 6/30, held warfarin and bridged with lovenox. Was found to have ulcers so taking meds. Will need to have repeat endoscopy in near future--> Once his INR is 2-3 for 4 weeks we will consider doing a DCCV to determine if he feels better in SR---> endoscopy 9/25, holding 5 days and bridging---> might have hernia repair in future---> 3/4, awaiting clearance and instructions---> none      []   [x]       Missed Dose denies   []   [x]       Extra Dose denies  []   [x]       Change in medications  inc crestor --> pain ball coming out today --> has been taking tylenol 1 g qod. On augmentin x 7 d ---> finishing abx today---> Bactrim for 10 days started 3/28 until 4/7 --> 10 day doxycycline course started 4/18 --> no changes   [x]   []       Change in health/diet/appetite -

## 2024-05-13 ENCOUNTER — APPOINTMENT (OUTPATIENT)
Dept: PHARMACY | Age: 79
End: 2024-05-13
Payer: MEDICARE

## 2024-05-13 DIAGNOSIS — I48.91 ATRIAL FIBRILLATION, UNSPECIFIED TYPE (HCC): Primary | ICD-10-CM

## 2024-05-13 LAB — INTERNATIONAL NORMALIZATION RATIO, POC: 1.7

## 2024-05-13 PROCEDURE — 85610 PROTHROMBIN TIME: CPT

## 2024-05-13 PROCEDURE — 99211 OFF/OP EST MAY X REQ PHY/QHP: CPT

## 2024-05-13 NOTE — PROGRESS NOTES
Mr. Manish Elliott is a 79 y.o. y/o male with history of Afib who presents today for anticoagulation monitoring and adjustment.    Pertinent PMH: HTN, DVT, CAD, factor V leiden, gastric bypass   Per cardio 7/2023:  Discussed changing to Eliquis once his GI issues have been resolved. Hx of GI bleed.  Pt pt has been on warfarin for many years prior to clinic managing   Patient Reported Findings:  Yes     No  [x]   []       Patient verifies current dosing regimen as listed takes warfarin 4 mg qd. Pt takes 6 mg on sun, mon, tues and thurs and 4 mg all other days of the week---> confirmed dose --> pt has been taking 6 mg daily---> confirmed      [x]   []       S/S bleeding/bruising/swelling/SOB -does have bruising from shots---> did have some nosebleeds recently---> did have nosebleed---> has hematoma following procedure---> improving, but still bleeding---> improving --> small amount of bleeding from surgery---> incision is still not fully closed --> staples are closing and healing ---> denies     []   [x]       Blood in urine or stool denies   []   [x]       Procedures scheduled in the future at this time Pulled a few teeth recently. Did not hold warfarin. Had endoscopy on 6/30, held warfarin and bridged with lovenox. Was found to have ulcers so taking meds. Will need to have repeat endoscopy in near future--> Once his INR is 2-3 for 4 weeks we will consider doing a DCCV to determine if he feels better in SR---> endoscopy 9/25, holding 5 days and bridging---> might have hernia repair in future---> 3/4, awaiting clearance and instructions---> none      []   [x]       Missed Dose denies   []   [x]       Extra Dose denies  []   [x]       Change in medications  inc crestor --> pain ball coming out today --> has been taking tylenol 1 g qod. On augmentin x 7 d ---> finishing abx today---> Bactrim for 10 days started 3/28 until 4/7 --> 10 day doxycycline course started 4/18 --> no changes   [x]   []       Change in

## 2024-05-20 ENCOUNTER — ANTI-COAG VISIT (OUTPATIENT)
Dept: PHARMACY | Age: 79
End: 2024-05-20
Payer: MEDICARE

## 2024-05-20 DIAGNOSIS — I48.91 ATRIAL FIBRILLATION, UNSPECIFIED TYPE (HCC): Primary | ICD-10-CM

## 2024-05-20 LAB — INTERNATIONAL NORMALIZATION RATIO, POC: 2.9

## 2024-05-20 PROCEDURE — 99211 OFF/OP EST MAY X REQ PHY/QHP: CPT

## 2024-05-20 PROCEDURE — 85610 PROTHROMBIN TIME: CPT

## 2024-06-03 ENCOUNTER — ANTI-COAG VISIT (OUTPATIENT)
Dept: PHARMACY | Age: 79
End: 2024-06-03
Payer: MEDICARE

## 2024-06-03 DIAGNOSIS — I48.91 ATRIAL FIBRILLATION, UNSPECIFIED TYPE (HCC): Primary | ICD-10-CM

## 2024-06-03 LAB — INTERNATIONAL NORMALIZATION RATIO, POC: 3.1

## 2024-06-03 PROCEDURE — 85610 PROTHROMBIN TIME: CPT

## 2024-06-03 PROCEDURE — 99211 OFF/OP EST MAY X REQ PHY/QHP: CPT

## 2024-06-03 RX ORDER — IPRATROPIUM BROMIDE 42 UG/1
2 SPRAY, METERED NASAL 2 TIMES DAILY
COMMUNITY
Start: 2024-05-31 | End: 2025-05-26

## 2024-06-03 NOTE — PROGRESS NOTES
health/diet/appetite - cabbage, greens, broccoli, brussel sprouts. Is going to assess consistent amount in next few weeks---> no vit k recently  --> had a can of greens and one other serving of greens. Doesn't plan to have as much as this week ---> no greens this week---> one can of mixed greens --> had vit k 1-2 times in week---> avoided greens in past week  --> no vit k, but will return to having once a week --> no greens but wants to add Turnip greens in 1-2 times/week, will start with once---> no greens, had one episode of vomiting dt drainage---> had turnip greens twice in past week---> greens once/week, no NVD --> no vit k since surgery --> plans to resume vit k this week---> sauerkraut --> no changes, no nvd --> vomited twice 4/27 & 4/28. Had broccoli twice this past week---> asparagus once --> no vit k in the past week    [x]   []       Change in alcohol use beer and moonshine. None recently d/t stomach ulcers. Normally in past would have 1-2 shots every night ---> nothing in past week --> no changes, continues with less alcohol (one beer occasionally) ---> regular beer, two last night --> regular beer 2-3 several nights/week---> 2 beers last night, more alcohol with holiday --> normal alcohol --> had a shot last night     []   [x]       Change in activity  [x]   []       Hospital admission Pt in hospital 3/10-3/11 for hematoma that developed after removal of q-pump on Sun. INR 1.6 today. Hematoma on the right inguinal area will take some time for it to reabsorb. Pt was instructed to hold warfarin and lovenox until Wed 3/13  [x]   []       Emergency department visit in ED 3/15 for post op bleeding. Was d/c on augmentin x 7 d. INR 1.3  []   [x]       Other complaints   Clinical Outcomes:  Yes     No  []   [x]       Major bleeding event  []   [x]       Thromboembolic event    Duration of warfarin Therapy: indefinite   INR Range:  2.0-3.0  RF at [facility], switch to [employer].     INR is 3.1 today dt no greens, will add

## 2024-06-17 ENCOUNTER — ANTI-COAG VISIT (OUTPATIENT)
Dept: PHARMACY | Age: 79
End: 2024-06-17
Payer: MEDICARE

## 2024-06-17 DIAGNOSIS — I48.91 ATRIAL FIBRILLATION, UNSPECIFIED TYPE (HCC): Primary | ICD-10-CM

## 2024-06-17 LAB — INTERNATIONAL NORMALIZATION RATIO, POC: 2.2

## 2024-06-17 PROCEDURE — 99211 OFF/OP EST MAY X REQ PHY/QHP: CPT

## 2024-06-17 PROCEDURE — 85610 PROTHROMBIN TIME: CPT

## 2024-06-17 NOTE — PROGRESS NOTES
Mr. Manish Elliott is a 79 y.o. y/o male with history of Afib who presents today for anticoagulation monitoring and adjustment.    Pertinent PMH: HTN, DVT, CAD, factor V leiden, gastric bypass   Per cardio 7/2023:  Discussed changing to Eliquis once his GI issues have been resolved. Hx of GI bleed.  Pt pt has been on warfarin for many years prior to clinic managing   Patient Reported Findings:  Yes     No  [x]   []       Patient verifies current dosing regimen as listed takes warfarin 4 mg qd. Pt takes 6 mg on sun, mon, tues and thurs and 4 mg all other days of the week---> confirmed dose --> pt has been taking 6 mg daily---> confirmed      [x]   []       S/S bleeding/bruising/swelling/SOB -does have bruising from shots---> did have some nosebleeds recently---> did have nosebleed---> has hematoma following procedure---> improving, but still bleeding---> improving --> small amount of bleeding from surgery---> incision is still not fully closed --> staples are closing and healing ---> still seeping after staples removed     []   [x]       Blood in urine or stool denies   []   [x]       Procedures scheduled in the future at this time Pulled a few teeth recently. Did not hold warfarin. Had endoscopy on 6/30, held warfarin and bridged with lovenox. Was found to have ulcers so taking meds. Will need to have repeat endoscopy in near future--> Once his INR is 2-3 for 4 weeks we will consider doing a DCCV to determine if he feels better in SR---> endoscopy 9/25, holding 5 days and bridging---> might have hernia repair in future---> 3/4, awaiting clearance and instructions---> none      []   [x]       Missed Dose denies   []   [x]       Extra Dose denies  []   [x]       Change in medications  inc crestor --> pain ball coming out today --> has been taking tylenol 1 g qod. On augmentin x 7 d ---> finishing abx today---> Bactrim for 10 days started 3/28 until 4/7 --> 10 day doxycycline course started 4/18 --> nasal spray, no abx

## 2024-07-01 ENCOUNTER — TELEPHONE (OUTPATIENT)
Dept: ADMINISTRATIVE | Age: 79
End: 2024-07-01

## 2024-07-01 NOTE — TELEPHONE ENCOUNTER
Submitted PA for LOVENOX  Via Replaced by [facility] Key: [id number]  STATUS: APPROVED 6/1/24-7/1/25    Follow up done daily; if no decision with in three days we will refax.  If another three days goes by with no decision will call the insurance for status.

## 2024-07-08 ENCOUNTER — ANTI-COAG VISIT (OUTPATIENT)
Dept: PHARMACY | Age: 79
End: 2024-07-08
Payer: MEDICARE

## 2024-07-08 DIAGNOSIS — I48.91 ATRIAL FIBRILLATION, UNSPECIFIED TYPE (HCC): Primary | ICD-10-CM

## 2024-07-08 LAB
INTERNATIONAL NORMALIZATION RATIO, POC: 4
PROTHROMBIN TIME, POC: NORMAL

## 2024-07-08 PROCEDURE — 99212 OFFICE O/P EST SF 10 MIN: CPT

## 2024-07-08 PROCEDURE — 85610 PROTHROMBIN TIME: CPT

## 2024-07-08 NOTE — PROGRESS NOTES
then take 4 mg on Mon and Fri and 6mg all other days.  Encouraged to maintain a consistency of vegetables/salads.   Recheck INR in 2 weeks, 7/23    Patient consent signed 7/12/23    Referring is Dr. Modi cardio   INR (no units)   Date Value   07/07/2023 1.50   12/18/2017 1.13     INR,(POC) (no units)   Date Value   06/17/2024 2.2   06/03/2024 3.1   05/20/2024 2.9   05/13/2024 1.7     For Pharmacy Admin Tracking Only    Intervention Detail: Dose Adjustment: 1, reason: Therapy Optimization  Total # of Interventions Recommended: 1  Total # of Interventions Accepted: 1  Time Spent (min): 15

## 2024-07-23 ENCOUNTER — ANTI-COAG VISIT (OUTPATIENT)
Dept: PHARMACY | Age: 79
End: 2024-07-23
Payer: MEDICARE

## 2024-07-23 DIAGNOSIS — I48.91 ATRIAL FIBRILLATION, UNSPECIFIED TYPE (HCC): Primary | ICD-10-CM

## 2024-07-23 LAB
INTERNATIONAL NORMALIZATION RATIO, POC: 2.4
PROTHROMBIN TIME, POC: NORMAL

## 2024-07-23 PROCEDURE — 99211 OFF/OP EST MAY X REQ PHY/QHP: CPT

## 2024-07-23 PROCEDURE — 85610 PROTHROMBIN TIME: CPT

## 2024-07-23 NOTE — PROGRESS NOTES
spray, no abx --> no changes      []   [x]       Change in health/diet/appetite - cabbage, greens, broccoli, brussel sprouts. Is going to assess consistent amount in next few weeks---> no vit k recently  --> had a can of greens and one other serving of greens. Doesn't plan to have as much as this week ---> no greens this week---> one can of mixed greens --> had vit k 1-2 times in week---> avoided greens in past week  --> no vit k, but will return to having once a week --> no greens but wants to add Turnip greens in 1-2 times/week, will start with once---> had turnip greens twice in past week---> greens once/week, no NVD -->  sauerkraut --> vomited twice 4/27 & 4/28. Had broccoli twice this past week---> asparagus once --> no vit k in the past week --> has been eating zucchini and squash (not much vit k) --> eating some zucchini and asparagus, sauerkraut, spinach   [x]   []       Change in alcohol use beer and moonshine. None recently d/t stomach ulcers. Normally in past would have 1-2 shots every night ---> nothing in past week --> no changes, continues with less alcohol (one beer occasionally) ---> regular beer, two last night --> regular beer 2-3 several nights/week---> 2 beers last night, more alcohol with holiday --> normal alcohol --> had a shot last night --> had more alcohol for the holiday---> no changes    []   [x]       Change in activity  [x]   []       Hospital admission Pt in hospital 3/10-3/11 for hematoma that developed after removal of q-pump on Sun. INR 1.6 today. Hematoma on the right inguinal area will take some time for it to reabsorb. Pt was instructed to hold warfarin and lovenox until Wed 3/13  []   [x]       Emergency department visit  []   [x]       Other complaints   Clinical Outcomes:  Yes     No  []   [x]       Major bleeding event  []   [x]       Thromboembolic event    Duration of warfarin Therapy: indefinite   INR Range:  2.0-3.0  RF at [facility], switch to [employer].     INR is 2.4 today

## 2024-08-13 ENCOUNTER — ANTI-COAG VISIT (OUTPATIENT)
Dept: PHARMACY | Age: 79
End: 2024-08-13
Payer: MEDICARE

## 2024-08-13 DIAGNOSIS — I48.91 ATRIAL FIBRILLATION, UNSPECIFIED TYPE (HCC): Primary | ICD-10-CM

## 2024-08-13 LAB
INTERNATIONAL NORMALIZATION RATIO, POC: 3
PROTHROMBIN TIME, POC: 0

## 2024-08-13 PROCEDURE — 99211 OFF/OP EST MAY X REQ PHY/QHP: CPT

## 2024-08-13 PROCEDURE — 85610 PROTHROMBIN TIME: CPT

## 2024-08-13 NOTE — PROGRESS NOTES
[facility].     INR is 3.0 today   Take 4 mg on Mon and Fri and 6mg all other days.  Encouraged to maintain a consistency of vegetables/salads.   Recheck INR in 4 weeks, 9/10  Patient consent signed 7/12/23    Referring is Dr. Modi cardio   INR (no units)   Date Value   07/07/2023 1.50   12/18/2017 1.13     INR,(POC) (no units)   Date Value   07/23/2024 2.4   07/08/2024 4.0   06/17/2024 2.2   06/03/2024 3.1     For Pharmacy Admin Tracking Only  Total # of Interventions Recommended: 0  Total # of Interventions Accepted: 0  Time Spent (min): 15

## 2024-09-04 RX ORDER — ROSUVASTATIN CALCIUM 10 MG/1
10 TABLET, COATED ORAL DAILY
Qty: 90 TABLET | Refills: 3 | Status: SHIPPED | OUTPATIENT
Start: 2024-09-04

## 2024-09-04 NOTE — TELEPHONE ENCOUNTER
Last OV: 2024  Last Labs: 10/24/2023 [place]  Last Refills: 2023  Next Appt: None scheduled  Last [id number]

## 2024-09-07 PROCEDURE — 93296 REM INTERROG EVL PM/IDS: CPT | Performed by: INTERNAL MEDICINE

## 2024-09-07 PROCEDURE — 93294 REM INTERROG EVL PM/LDLS PM: CPT | Performed by: INTERNAL MEDICINE

## 2024-09-10 ENCOUNTER — ANTI-COAG VISIT (OUTPATIENT)
Dept: PHARMACY | Age: 79
End: 2024-09-10
Payer: MEDICARE

## 2024-09-10 DIAGNOSIS — I48.91 ATRIAL FIBRILLATION, UNSPECIFIED TYPE (HCC): Primary | ICD-10-CM

## 2024-09-10 LAB
INTERNATIONAL NORMALIZATION RATIO, POC: 6
PROTHROMBIN TIME, POC: 0

## 2024-09-10 PROCEDURE — 99212 OFFICE O/P EST SF 10 MIN: CPT

## 2024-09-10 PROCEDURE — 85610 PROTHROMBIN TIME: CPT

## 2024-09-17 ENCOUNTER — ANTI-COAG VISIT (OUTPATIENT)
Dept: PHARMACY | Age: 79
End: 2024-09-17
Payer: MEDICARE

## 2024-09-17 DIAGNOSIS — I48.91 ATRIAL FIBRILLATION, UNSPECIFIED TYPE (HCC): Primary | ICD-10-CM

## 2024-09-17 LAB
INTERNATIONAL NORMALIZATION RATIO, POC: 2.3
PROTHROMBIN TIME, POC: 0

## 2024-09-17 PROCEDURE — 99212 OFFICE O/P EST SF 10 MIN: CPT

## 2024-09-17 PROCEDURE — 85610 PROTHROMBIN TIME: CPT

## 2024-09-24 ENCOUNTER — ANTI-COAG VISIT (OUTPATIENT)
Dept: PHARMACY | Age: 79
End: 2024-09-24
Payer: MEDICARE

## 2024-09-24 DIAGNOSIS — I48.91 ATRIAL FIBRILLATION, UNSPECIFIED TYPE (HCC): Primary | ICD-10-CM

## 2024-09-24 LAB
INTERNATIONAL NORMALIZATION RATIO, POC: 3.1
PROTHROMBIN TIME, POC: 0

## 2024-09-24 PROCEDURE — 99211 OFF/OP EST MAY X REQ PHY/QHP: CPT | Performed by: PHYSICIAN ASSISTANT

## 2024-09-24 PROCEDURE — 85610 PROTHROMBIN TIME: CPT | Performed by: PHYSICIAN ASSISTANT

## 2024-10-08 ENCOUNTER — ANTI-COAG VISIT (OUTPATIENT)
Dept: PHARMACY | Age: 79
End: 2024-10-08
Payer: MEDICARE

## 2024-10-08 DIAGNOSIS — I48.91 ATRIAL FIBRILLATION, UNSPECIFIED TYPE (HCC): Primary | ICD-10-CM

## 2024-10-08 LAB
INTERNATIONAL NORMALIZATION RATIO, POC: 1.7
PROTHROMBIN TIME, POC: 0

## 2024-10-08 PROCEDURE — 85610 PROTHROMBIN TIME: CPT | Performed by: PHYSICIAN ASSISTANT

## 2024-10-08 PROCEDURE — 99212 OFFICE O/P EST SF 10 MIN: CPT | Performed by: PHYSICIAN ASSISTANT

## 2024-10-08 NOTE — PROGRESS NOTES
Mr. Manish Elliott is a 79 y.o. y/o male with history of Afib who presents today for anticoagulation monitoring and adjustment.    Pertinent PMH: HTN, DVT, CAD, factor V leiden, gastric bypass   Per cardio 7/2023:  Discussed changing to Eliquis once his GI issues have been resolved. Hx of GI bleed.  Pt pt has been on warfarin for many years prior to clinic managing   Patient Reported Findings:  Yes     No  [x]   []       Patient verifies current dosing regimen as listed takes warfarin 4 mg qd. Pt takes 6 mg on sun, mon, tues and thurs and 4 mg all other days of the week---> confirmed dose --> pt has been taking 6 mg daily---> confirmed      [x]   []       S/S bleeding/bruising/swelling/SOB -has hematoma following procedure---> improving, but still bleeding---> small amount of bleeding from surgery---> incision is still not fully closed --> staples are closing and healing ---> still seeping after staples removed---> bruising more--->  bruises gone        []   [x]       Blood in urine or stool denies   []   [x]       Procedures scheduled in the future at this time Pulled a few teeth recently. Did not hold warfarin. Had endoscopy on 6/30, held warfarin and bridged with lovenox. Was found to have ulcers so taking meds. Will need to have repeat endoscopy in near future--> Once his INR is 2-3 for 4 weeks we will consider doing a DCCV to determine if he feels better in SR---> endoscopy 9/25, holding 5 days and bridging---> might have hernia repair in future---> 3/4, awaiting clearance and instructions---> none      [x]   []       Missed Dose missed last night, took this am    []   [x]       Extra Dose denies  []   [x]       Change in medications  inc crestor --> pain ball coming out today --> has been taking tylenol 1 g qod. On augmentin x 7 d ---> finishing abx today---> Bactrim for 10 days started 3/28 until 4/7 --> 10 day doxycycline course started 4/18 --> nasal spray, no abx --> tylenol ---> no changes , no tylenol

## 2024-10-22 ENCOUNTER — ANTI-COAG VISIT (OUTPATIENT)
Dept: PHARMACY | Age: 79
End: 2024-10-22
Payer: MEDICARE

## 2024-10-22 DIAGNOSIS — I48.91 ATRIAL FIBRILLATION, UNSPECIFIED TYPE (HCC): Primary | ICD-10-CM

## 2024-10-22 LAB
INTERNATIONAL NORMALIZATION RATIO, POC: 2
PROTHROMBIN TIME, POC: 0

## 2024-10-22 PROCEDURE — 99211 OFF/OP EST MAY X REQ PHY/QHP: CPT

## 2024-10-22 PROCEDURE — 85610 PROTHROMBIN TIME: CPT

## 2024-10-22 NOTE — PROGRESS NOTES
Mr. Manish Elliott is a 79 y.o. y/o male with history of Afib who presents today for anticoagulation monitoring and adjustment.    Pertinent PMH: HTN, DVT, CAD, factor V leiden, gastric bypass   Per cardio 7/2023:  Discussed changing to Eliquis once his GI issues have been resolved. Hx of GI bleed.  Pt pt has been on warfarin for many years prior to clinic managing   Patient Reported Findings:  Yes     No  [x]   []       Patient verifies current dosing regimen as listed takes warfarin 4 mg qd. Pt takes 6 mg on sun, mon, tues and thurs and 4 mg all other days of the week---> confirmed dose --> pt has been taking 6 mg daily---> confirmed      [x]   []       S/S bleeding/bruising/swelling/SOB -has hematoma following procedure---> improving, but still bleeding---> small amount of bleeding from surgery---> incision is still not fully closed --> staples are closing and healing ---> still seeping after staples removed---> bruising more--->  bruises gone---> denies         []   [x]       Blood in urine or stool denies   []   [x]       Procedures scheduled in the future at this time Pulled a few teeth recently. Did not hold warfarin. Had endoscopy on 6/30, held warfarin and bridged with lovenox. Was found to have ulcers so taking meds. Will need to have repeat endoscopy in near future--> Once his INR is 2-3 for 4 weeks we will consider doing a DCCV to determine if he feels better in SR---> endoscopy 9/25, holding 5 days and bridging---> might have hernia repair in future---> 3/4, awaiting clearance and instructions---> none      [x]   []       Missed Dose missed last night, took this am ---> denies    []   [x]       Extra Dose denies  []   [x]       Change in medications  inc crestor --> pain ball coming out today --> has been taking tylenol 1 g qod. On augmentin x 7 d ---> finishing abx today---> Bactrim for 10 days started 3/28 until 4/7 --> 10 day doxycycline course started 4/18 --> nasal spray, no abx --> tylenol ---> no

## 2024-11-12 ENCOUNTER — ANTI-COAG VISIT (OUTPATIENT)
Dept: PHARMACY | Age: 79
End: 2024-11-12
Payer: MEDICARE

## 2024-11-12 DIAGNOSIS — I48.91 ATRIAL FIBRILLATION, UNSPECIFIED TYPE (HCC): Primary | ICD-10-CM

## 2024-11-12 LAB
INTERNATIONAL NORMALIZATION RATIO, POC: 1.9
PROTHROMBIN TIME, POC: 0

## 2024-11-12 PROCEDURE — 99212 OFFICE O/P EST SF 10 MIN: CPT | Performed by: PHYSICIAN ASSISTANT

## 2024-11-12 PROCEDURE — 85610 PROTHROMBIN TIME: CPT | Performed by: PHYSICIAN ASSISTANT

## 2024-11-12 NOTE — PROGRESS NOTES
Mr. Manish Elliott is a 79 y.o. y/o male with history of Afib who presents today for anticoagulation monitoring and adjustment.    Pertinent PMH: HTN, DVT, CAD, factor V leiden, gastric bypass   Per cardio 7/2023:  Discussed changing to Eliquis once his GI issues have been resolved. Hx of GI bleed.  Pt pt has been on warfarin for many years prior to clinic managing   Patient Reported Findings:  Yes     No  [x]   []       Patient verifies current dosing regimen as listed takes warfarin 4 mg qd. Pt takes 6 mg on sun, mon, tues and thurs and 4 mg all other days of the week---> confirmed dose --> pt has been taking 6 mg daily---> confirmed      []   [x]       S/S bleeding/bruising/swelling/SOB -has hematoma following procedure---> improving, but still bleeding---> small amount of bleeding from surgery---> incision is still not fully closed --> staples are closing and healing ---> still seeping after staples removed---> bruising more--->  bruises gone---> denies         []   [x]       Blood in urine or stool denies   []   [x]       Procedures scheduled in the future at this time Pulled a few teeth recently. Did not hold warfarin. Had endoscopy on 6/30, held warfarin and bridged with lovenox. Was found to have ulcers so taking meds. Will need to have repeat endoscopy in near future--> Once his INR is 2-3 for 4 weeks we will consider doing a DCCV to determine if he feels better in SR---> endoscopy 9/25, holding 5 days and bridging---> might have hernia repair in future---> 3/4, awaiting clearance and instructions---> none      []   [x]       Missed Dose denies    []   [x]       Extra Dose denies  [x]   []       Change in medications - nasal spray, no abx --> tylenol ---> no changes , no tylenol ---> on antibiotic eye drop      [x]   []       Change in health/diet/appetite - cabbage, greens, broccoli, brussel sprouts. Is going to assess consistent amount in next few weeks---> no vit k recently  --> had a can of greens and

## 2024-11-26 ENCOUNTER — ANTI-COAG VISIT (OUTPATIENT)
Dept: PHARMACY | Age: 79
End: 2024-11-26
Payer: MEDICARE

## 2024-11-26 DIAGNOSIS — I48.91 ATRIAL FIBRILLATION, UNSPECIFIED TYPE (HCC): Primary | ICD-10-CM

## 2024-11-26 LAB
INTERNATIONAL NORMALIZATION RATIO, POC: 1.7
PROTHROMBIN TIME, POC: 0

## 2024-11-26 PROCEDURE — 85610 PROTHROMBIN TIME: CPT

## 2024-11-26 PROCEDURE — 99211 OFF/OP EST MAY X REQ PHY/QHP: CPT

## 2024-11-26 NOTE — PROGRESS NOTES
other days of the week   Encouraged to maintain a consistency of vegetables/salads.   Recheck INR in 2 weeks, 12/10  Patient consent signed 24    Referring is Dr. Modi cardio   INR (no units)   Date Value   2023 1.50   2017 1.13     INR,(POC) (no units)   Date Value   2024 1.9   10/22/2024 2.0   10/08/2024 1.7   2024 3.1     For Pharmacy Admin Tracking Only    Intervention Detail: Adherence Monitorin  Total # of Interventions Recommended: 1  Total # of Interventions Accepted: 1  Time Spent (min): 15

## 2024-12-07 PROCEDURE — 93294 REM INTERROG EVL PM/LDLS PM: CPT | Performed by: INTERNAL MEDICINE

## 2024-12-07 PROCEDURE — 93296 REM INTERROG EVL PM/IDS: CPT | Performed by: INTERNAL MEDICINE

## 2024-12-10 ENCOUNTER — ANTI-COAG VISIT (OUTPATIENT)
Dept: PHARMACY | Age: 79
End: 2024-12-10
Payer: MEDICARE

## 2024-12-10 DIAGNOSIS — I48.91 ATRIAL FIBRILLATION, UNSPECIFIED TYPE (HCC): Primary | ICD-10-CM

## 2024-12-10 LAB
INTERNATIONAL NORMALIZATION RATIO, POC: 2.5
PROTHROMBIN TIME, POC: 0

## 2024-12-10 PROCEDURE — 99211 OFF/OP EST MAY X REQ PHY/QHP: CPT | Performed by: PHYSICIAN ASSISTANT

## 2024-12-10 PROCEDURE — 85610 PROTHROMBIN TIME: CPT | Performed by: PHYSICIAN ASSISTANT

## 2024-12-10 NOTE — PROGRESS NOTES
vegetables/salads.   Recheck INR in 3 weeks, 12/31    Patient consent signed 8/11/24    Referring is Dr. Modi cardio   INR (no units)   Date Value   07/07/2023 1.50   12/18/2017 1.13     INR,(POC) (no units)   Date Value   11/26/2024 1.7   11/12/2024 1.9   10/22/2024 2.0   10/08/2024 1.7     For Pharmacy Admin Tracking Only    Total # of Interventions Recommended: 0  Total # of Interventions Accepted: 0  Time Spent (min): 15

## 2024-12-20 ENCOUNTER — TELEPHONE (OUTPATIENT)
Dept: CARDIOLOGY CLINIC | Age: 79
End: 2024-12-20

## 2024-12-20 NOTE — TELEPHONE ENCOUNTER
Nancy states pt has to have an intense light treatment for his eyes called [facility]. On Monday 12/23. Eye doctor wanted him to check to make sure it would be ok to have with his pacemaker. Please call to advise

## 2024-12-20 NOTE — TELEPHONE ENCOUNTER
Spoke with Nancy, patient's girl friend and advised her of the message from [facility].  Verbalized understanding.

## 2024-12-20 NOTE — TELEPHONE ENCOUNTER
Recommend he call [facility] [facility] to discuss as this is not a common treatment and should be discussed with the device . [phone]

## 2024-12-31 ENCOUNTER — ANTI-COAG VISIT (OUTPATIENT)
Dept: PHARMACY | Age: 79
End: 2024-12-31
Payer: MEDICARE

## 2024-12-31 DIAGNOSIS — I48.91 ATRIAL FIBRILLATION, UNSPECIFIED TYPE (HCC): Primary | ICD-10-CM

## 2024-12-31 LAB
INTERNATIONAL NORMALIZATION RATIO, POC: 3.8
PROTHROMBIN TIME, POC: 0

## 2024-12-31 PROCEDURE — 85610 PROTHROMBIN TIME: CPT

## 2024-12-31 PROCEDURE — 99211 OFF/OP EST MAY X REQ PHY/QHP: CPT

## 2024-12-31 NOTE — PROGRESS NOTES
Mr. Manish Elliott is a 79 y.o. y/o male with history of Afib who presents today for anticoagulation monitoring and adjustment.    Pertinent PMH: HTN, DVT, CAD, factor V leiden, gastric bypass   Per cardio 7/2023:  Discussed changing to Eliquis once his GI issues have been resolved. Hx of GI bleed.  Pt pt has been on warfarin for many years prior to clinic managing   Patient Reported Findings:  Yes     No  [x]   []       Patient verifies current dosing regimen as listed takes warfarin 4 mg qd. Pt takes 6 mg on sun, mon, tues and thurs and 4 mg all other days of the week---> confirmed dose --> pt has been taking 6 mg daily---> confirmed      [x]   []       S/S bleeding/bruising/swelling/SOB -has hematoma following procedure---> improving, but still bleeding---> small amount of bleeding from surgery---> incision is still not fully closed --> staples are closing and healing ---> still seeping after staples removed---> bruising more--->  bruises gone---> has black eye from unknown cause         []   [x]       Blood in urine or stool denies   []   [x]       Procedures scheduled in the future at this time Pulled a few teeth recently. Did not hold warfarin. Had endoscopy on 6/30, held warfarin and bridged with lovenox. Was found to have ulcers so taking meds. Will need to have repeat endoscopy in near future--> Once his INR is 2-3 for 4 weeks we will consider doing a DCCV to determine if he feels better in SR---> endoscopy 9/25, holding 5 days and bridging---> might have hernia repair in future---> 3/4, awaiting clearance and instructions---> surgeon apt 12/24 to discuss knee/foot    []   [x]       Missed Dose denies    []   [x]       Extra Dose denies  []   [x]       Change in medications - nasal spray, no abx --> tylenol ---> no changes , no tylenol ---> on antibiotic eye drop---> medrol pack started yesterday, not taking muscle relaxer  --> no changes       []   [x]       Change in health/diet/appetite - cabbage,

## 2025-01-06 DIAGNOSIS — I48.91 ATRIAL FIBRILLATION, UNSPECIFIED TYPE (HCC): ICD-10-CM

## 2025-01-06 RX ORDER — WARFARIN SODIUM 4 MG/1
TABLET ORAL
Qty: 109 TABLET | Refills: 3 | Status: SHIPPED | OUTPATIENT
Start: 2025-01-06

## 2025-01-14 ENCOUNTER — ANTI-COAG VISIT (OUTPATIENT)
Dept: PHARMACY | Age: 80
End: 2025-01-14
Payer: MEDICARE

## 2025-01-14 DIAGNOSIS — I48.91 ATRIAL FIBRILLATION, UNSPECIFIED TYPE (HCC): Primary | ICD-10-CM

## 2025-01-14 LAB
INTERNATIONAL NORMALIZATION RATIO, POC: 1.4
PROTHROMBIN TIME, POC: 0

## 2025-01-14 PROCEDURE — 99212 OFFICE O/P EST SF 10 MIN: CPT | Performed by: PHYSICIAN ASSISTANT

## 2025-01-14 PROCEDURE — 85610 PROTHROMBIN TIME: CPT | Performed by: PHYSICIAN ASSISTANT

## 2025-01-14 NOTE — PROGRESS NOTES
Mr. Manish Elliott is a 79 y.o. y/o male with history of Afib who presents today for anticoagulation monitoring and adjustment.    Pertinent PMH: HTN, DVT, CAD, factor V leiden, gastric bypass   Per cardio 7/2023:  Discussed changing to Eliquis once his GI issues have been resolved. Hx of GI bleed.  Pt pt has been on warfarin for many years prior to clinic managing   Patient Reported Findings:  Yes     No  [x]   []       Patient verifies current dosing regimen as listed takes warfarin 4 mg qd. Pt takes 6 mg on sun, mon, tues and thurs and 4 mg all other days of the week---> confirmed dose --> pt has been taking 6 mg daily---> confirmed      [x]   []       S/S bleeding/bruising/swelling/SOB  bruising more--->  bruises gone---> has black eye from unknown cause --> had a few nose bleeds in the past 2 weeks   []   [x]       Blood in urine or stool denies   []   [x]       Procedures scheduled in the future at this time Pulled a few teeth recently. Did not hold warfarin. Had endoscopy on 6/30, held warfarin and bridged with lovenox. Was found to have ulcers so taking meds. Will need to have repeat endoscopy in near future--> Once his INR is 2-3 for 4 weeks we will consider doing a DCCV to determine if he feels better in SR---> endoscopy 9/25, holding 5 days and bridging---> might have hernia repair in future---> 3/4, awaiting clearance and instructions---> surgeon apt 12/24 to discuss knee/foot    []   [x]       Missed Dose denies    []   [x]       Extra Dose denies  []   [x]       Change in medications - nasal spray, no abx --> tylenol ---> no changes , no tylenol ---> on antibiotic eye drop---> medrol pack started yesterday, not taking muscle relaxer  --> no changes, a few tylenol        []   [x]       Change in health/diet/appetite - cabbage, greens, broccoli, brussel sprouts. Is going to assess consistent amount in next few weeks---> no vit k recently  --> had a can of greens and one other serving of greens. Doesn't

## 2025-01-28 ENCOUNTER — ANTI-COAG VISIT (OUTPATIENT)
Dept: PHARMACY | Age: 80
End: 2025-01-28
Payer: MEDICARE

## 2025-01-28 DIAGNOSIS — I48.91 ATRIAL FIBRILLATION, UNSPECIFIED TYPE (HCC): Primary | ICD-10-CM

## 2025-01-28 LAB
INTERNATIONAL NORMALIZATION RATIO, POC: 3.2
PROTHROMBIN TIME, POC: 0

## 2025-01-28 PROCEDURE — 85610 PROTHROMBIN TIME: CPT | Performed by: PHYSICIAN ASSISTANT

## 2025-01-28 PROCEDURE — 99211 OFF/OP EST MAY X REQ PHY/QHP: CPT | Performed by: PHYSICIAN ASSISTANT

## 2025-01-28 NOTE — PROGRESS NOTES
Mr. Manish Elliott is a 79 y.o. y/o male with history of Afib who presents today for anticoagulation monitoring and adjustment.    Pertinent PMH: HTN, DVT, CAD, factor V leiden, gastric bypass   Per cardio 7/2023:  Discussed changing to Eliquis once his GI issues have been resolved. Hx of GI bleed.  Pt pt has been on warfarin for many years prior to clinic managing   Patient Reported Findings:  Yes     No  [x]   []       Patient verifies current dosing regimen as listed takes warfarin 4 mg qd. Pt takes 6 mg on sun, mon, tues and thurs and 4 mg all other days of the week---> confirmed dose --> pt has been taking 6 mg daily---> confirmed      []   [x]       S/S bleeding/bruising/swelling/SOB  bruising more--->  bruises gone---> has black eye from unknown cause --> had a few nose bleeds in the past 2 weeks -> denies  []   [x]       Blood in urine or stool denies   []   [x]       Procedures scheduled in the future at this time Pulled a few teeth recently. Did not hold warfarin. Had endoscopy on 6/30, held warfarin and bridged with lovenox. Was found to have ulcers so taking meds. Will need to have repeat endoscopy in near future--> Once his INR is 2-3 for 4 weeks we will consider doing a DCCV to determine if he feels better in SR---> endoscopy 9/25, holding 5 days and bridging---> might have hernia repair in future---> 3/4, awaiting clearance and instructions---> surgeon apt 12/24 to discuss knee/foot -> denies  []   [x]       Missed Dose denies    []   [x]       Extra Dose denies  []   [x]       Change in medications - nasal spray, no abx --> tylenol ---> no changes , no tylenol ---> on antibiotic eye drop---> medrol pack started yesterday, not taking muscle relaxer  --> no changes, a few tylenol        []   [x]       Change in health/diet/appetite - cabbage, greens, broccoli, brussel sprouts. Is going to assess consistent amount in next few weeks---> no vit k recently  --> had a can of greens and one other serving of

## 2025-02-11 ENCOUNTER — ANTI-COAG VISIT (OUTPATIENT)
Dept: PHARMACY | Age: 80
End: 2025-02-11
Payer: MEDICARE

## 2025-02-11 DIAGNOSIS — I48.91 ATRIAL FIBRILLATION, UNSPECIFIED TYPE (HCC): Primary | ICD-10-CM

## 2025-02-11 LAB
INTERNATIONAL NORMALIZATION RATIO, POC: 2.5
PROTHROMBIN TIME, POC: 0

## 2025-02-11 PROCEDURE — 99211 OFF/OP EST MAY X REQ PHY/QHP: CPT | Performed by: PHYSICIAN ASSISTANT

## 2025-02-11 PROCEDURE — 85610 PROTHROMBIN TIME: CPT | Performed by: PHYSICIAN ASSISTANT

## 2025-02-11 NOTE — PROGRESS NOTES
Mr. Manish Elliott is a 79 y.o. y/o male with history of Afib who presents today for anticoagulation monitoring and adjustment.    Pertinent PMH: HTN, DVT, CAD, factor V leiden, gastric bypass   Per cardio 7/2023:  Discussed changing to Eliquis once his GI issues have been resolved. Hx of GI bleed.  Pt pt has been on warfarin for many years prior to clinic managing   Patient Reported Findings:  Yes     No  [x]   []       Patient verifies current dosing regimen as listed takes warfarin 4 mg qd. Pt takes 6 mg on sun, mon, tues and thurs and 4 mg all other days of the week---> confirmed dose --> pt has been taking 6 mg daily---> confirmed      []   [x]       S/S bleeding/bruising/swelling/SOB  bruising more--->  bruises gone---> has black eye from unknown cause --> had a few nose bleeds in the past 2 weeks -> denies  []   [x]       Blood in urine or stool denies   []   [x]       Procedures scheduled in the future at this time Pulled a few teeth recently. Did not hold warfarin. Had endoscopy on 6/30, held warfarin and bridged with lovenox. Was found to have ulcers so taking meds. Will need to have repeat endoscopy in near future--> Once his INR is 2-3 for 4 weeks we will consider doing a DCCV to determine if he feels better in SR---> endoscopy 9/25, holding 5 days and bridging---> might have hernia repair in future---> 3/4, awaiting clearance and instructions---> surgeon apt 12/24 to discuss knee/foot -> denies  []   [x]       Missed Dose denies    []   [x]       Extra Dose denies  []   [x]       Change in medications - nasal spray, no abx --> tylenol ---> no changes , no tylenol ---> on antibiotic eye drop---> medrol pack started yesterday, not taking muscle relaxer  --> no changes, a few tylenol --> no changes   []   [x]       Change in health/diet/appetite - cabbage, greens, broccoli, brussel sprouts. Is going to assess consistent amount in next few weeks---> no vit k recently  --> had a can of greens and one other

## 2025-02-20 NOTE — PROGRESS NOTES
[facility]   Electrophysiology Office Visit    Date: 2/21/2025  EP Attending: Dr. Modi     Chief Complaint:   Chief Complaint   Patient presents with    1 Year Follow Up    Device Check    Atrial Fibrillation    Hypertension     History of Present Illness: History obtained from patient and medical record.    Manish Elliott is 79 y.o. male with a past medical history of HTN, DVT, sss, CAD s/p PCI to LAD 2002 and DVT  S/p Gen change 1/5/2022     ECG 6/30/2023 showed Esophageal stricture, esophagitis with ulceration, hiatal hernia, anastomotic stricture and anastomotic ulcer.     Interval history: Today, Manish Elliott is being seen for annual follow up. He is feeling well from a cardiac standpoint. He remains in atrial fibrillation. He denies any associated symptoms.  He has been complaint with medications and is tolerating them to date. Denies having chest pain, palpitations, shortness of breath, orthopnea/PND, cough, or dizziness at the time of this visit.    Assessment:    SSS  -s/p [facility] dual chamber PPM, S/p Gen change 1/5/2022  -Interrogation today shows:9.1  years remaining,   16%,  device interrogation today, Underlying AFVS , presenting AFVP      Permanent Atrial Fibrillation  -ECG today shows atrial fibrillation V paced   -He appears to have been in atrial fibrillation since 2017  -Continue lopressor 100 mg BID               -Patient has a [id number] Score of at least 4(age5, HTN, CAD) continue coumadin. 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 Managed by his PCP. His INR 2.5 02/2025              -Treatment options including cardioversion, rate control strategy, antiarrhythmics, anticoagulation and possible ablation were discussed with patient. Risks, benefits and alternative of each treatment options were explained. All questions answered              - He feels well and is not interested in cardioversion at this time.   -Will continue with rate control strategy.

## 2025-02-21 ENCOUNTER — OFFICE VISIT (OUTPATIENT)
Dept: CARDIOLOGY CLINIC | Age: 80
End: 2025-02-21

## 2025-02-21 ENCOUNTER — NURSE ONLY (OUTPATIENT)
Dept: CARDIOLOGY CLINIC | Age: 80
End: 2025-02-21

## 2025-02-21 VITALS
HEART RATE: 71 BPM | BODY MASS INDEX: 29.23 KG/M2 | DIASTOLIC BLOOD PRESSURE: 72 MMHG | HEIGHT: 72 IN | SYSTOLIC BLOOD PRESSURE: 118 MMHG | WEIGHT: 215.8 LBS | OXYGEN SATURATION: 98 %

## 2025-02-21 DIAGNOSIS — I48.91 ATRIAL FIBRILLATION, UNSPECIFIED TYPE (HCC): Primary | ICD-10-CM

## 2025-02-21 DIAGNOSIS — I10 BENIGN ESSENTIAL HTN: ICD-10-CM

## 2025-02-21 DIAGNOSIS — I25.10 CAD IN NATIVE ARTERY: ICD-10-CM

## 2025-02-21 DIAGNOSIS — I10 ESSENTIAL HYPERTENSION: ICD-10-CM

## 2025-02-21 DIAGNOSIS — Z95.0 PRESENCE OF CARDIAC PACEMAKER: ICD-10-CM

## 2025-02-21 DIAGNOSIS — Z95.0 PRESENCE OF CARDIAC PACEMAKER: Primary | ICD-10-CM

## 2025-02-21 DIAGNOSIS — I49.5 SSS (SICK SINUS SYNDROME) (HCC): ICD-10-CM

## 2025-02-21 DIAGNOSIS — I48.0 PAROXYSMAL ATRIAL FIBRILLATION (HCC): ICD-10-CM

## 2025-02-21 RX ORDER — LISINOPRIL 10 MG/1
5 TABLET ORAL DAILY
Status: SHIPPED | COMMUNITY
Start: 2025-02-21

## 2025-03-04 ENCOUNTER — ANTI-COAG VISIT (OUTPATIENT)
Dept: PHARMACY | Age: 80
End: 2025-03-04
Payer: MEDICARE

## 2025-03-04 DIAGNOSIS — I48.91 ATRIAL FIBRILLATION, UNSPECIFIED TYPE (HCC): Primary | ICD-10-CM

## 2025-03-04 LAB
INTERNATIONAL NORMALIZATION RATIO, POC: 2.4
PROTHROMBIN TIME, POC: 0

## 2025-03-04 PROCEDURE — 85610 PROTHROMBIN TIME: CPT | Performed by: PHYSICIAN ASSISTANT

## 2025-03-04 PROCEDURE — 99211 OFF/OP EST MAY X REQ PHY/QHP: CPT | Performed by: PHYSICIAN ASSISTANT

## 2025-03-04 NOTE — PROGRESS NOTES
Mr. Manish Elliott is a 79 y.o. y/o male with history of Afib who presents today for anticoagulation monitoring and adjustment.    Pertinent PMH: HTN, DVT, CAD, factor V leiden, gastric bypass   Per cardio 7/2023:  Discussed changing to Eliquis once his GI issues have been resolved. Hx of GI bleed.  Pt pt has been on warfarin for many years prior to clinic managing   Patient Reported Findings:  Yes     No  [x]   []       Patient verifies current dosing regimen as listed takes warfarin 4 mg qd. Pt takes 6 mg on sun, mon, tues and thurs and 4 mg all other days of the week---> confirmed dose --> pt has been taking 6 mg daily---> confirmed      []   [x]       S/S bleeding/bruising/swelling/SOB  bruising more--->  bruises gone---> has black eye from unknown cause --> had a few nose bleeds in the past 2 weeks -> denies  []   [x]       Blood in urine or stool denies   []   [x]       Procedures scheduled in the future at this time Pulled a few teeth recently. Did not hold warfarin. Had endoscopy on 6/30, held warfarin and bridged with lovenox. Was found to have ulcers so taking meds. Will need to have repeat endoscopy in near future--> Once his INR is 2-3 for 4 weeks we will consider doing a DCCV to determine if he feels better in SR---> endoscopy 9/25, holding 5 days and bridging---> might have hernia repair in future---> 3/4, awaiting clearance and instructions---> surgeon apt 12/24 to discuss knee/foot -> denies  []   [x]       Missed Dose denies    []   [x]       Extra Dose denies  []   [x]       Change in medications - tylenol --> no changes, a few tylenol --> no changes   [x]   []       Change in health/diet/appetite - cabbage, greens, broccoli, brussel sprouts. Is going to assess consistent amount in next few weeks---> had vit k 1-2 times in week---> no greens but wants to add Turnip greens in 1-2 times/week, will start with once---> eating some zucchini and asparagus, sauerkraut, spinach---> had vit k once this week

## 2025-03-20 NOTE — PROGRESS NOTES
Interventional Cardiology Consultation     Manish Elliott  1945    PCP: Balwinder Ash APRN - CNP  Referring Physician: Dr. Modi  Reason for Referral: BLE edema  Chief Complaint: \"I am fine\"  Chief Complaint   Patient presents with    Follow-up     Venous insufficiency        Subjective:   History of Present Illness: The patient is 79 y.o. male with a past medical history significant for hypertension, lupus, sleep apnea, CAD with PCI, sick sinus syndrome s/p dual chamber pacemaker, gastric bypass, atrial fibrillation, postoperative DVT and mild carotid artery stenosis. He also has a history of trauma to the left lateral calf. He quit smoking >40 yrs ago. Patient underwent procedure with Dr. Roldan in 2017 for left anterior tibial ulceration. Previously followed at [facility]. Patient here today for follow up. Reports overall he is doing okay. Reports worsening edema of his lower extremities. New ulcer of the left and right shin. Did go to the ED for the left shin ulcer, was started on antibiotics. He denies any fevers or chills. He is keeping the ulcers covered. Patient states he was wearing his compression stockings daily but had to stop due to the ulcers.       Past Medical History:   Diagnosis Date    ALIYA (acute kidney injury)     Allergic rhinitis     Atrial fibrillation (HCC)     CAD (coronary artery disease)     History of lupus anticoagulant disorder     Hypertension     Intestinal obstruction (HCC)     Obesity     Osteoarthritis     Sleep apnea      Past Surgical History:   Procedure Laterality Date    CORONARY ANGIOPLASTY      PACEMAKER INSERTION      ANTONY-EN-Y GASTRIC BYPASS      SHOULDER SURGERY      TOTAL KNEE ARTHROPLASTY       Family History   Problem Relation Age of Onset    High Blood Pressure Mother     Heart Attack Father     Heart Disease Sister      Social History     Tobacco Use    Smoking status: Former     Passive exposure: Past    Smokeless

## 2025-03-21 ENCOUNTER — OFFICE VISIT (OUTPATIENT)
Dept: CARDIOLOGY CLINIC | Age: 80
End: 2025-03-21
Payer: MEDICARE

## 2025-03-21 VITALS
HEART RATE: 76 BPM | SYSTOLIC BLOOD PRESSURE: 140 MMHG | BODY MASS INDEX: 29.8 KG/M2 | HEIGHT: 72 IN | DIASTOLIC BLOOD PRESSURE: 78 MMHG | OXYGEN SATURATION: 96 % | WEIGHT: 220 LBS

## 2025-03-21 DIAGNOSIS — L97.919 VENOUS ULCER OF RIGHT LOWER EXTREMITY WITH VARICOSE VEINS: ICD-10-CM

## 2025-03-21 DIAGNOSIS — L97.929 VENOUS ULCER OF LEFT LOWER EXTREMITY WITH VARICOSE VEINS: Primary | ICD-10-CM

## 2025-03-21 DIAGNOSIS — I83.019 VENOUS ULCER OF RIGHT LOWER EXTREMITY WITH VARICOSE VEINS: ICD-10-CM

## 2025-03-21 DIAGNOSIS — I87.2 VENOUS INSUFFICIENCY OF BOTH LOWER EXTREMITIES: ICD-10-CM

## 2025-03-21 DIAGNOSIS — I83.029 VENOUS ULCER OF LEFT LOWER EXTREMITY WITH VARICOSE VEINS: Primary | ICD-10-CM

## 2025-03-21 PROCEDURE — 3077F SYST BP >= 140 MM HG: CPT | Performed by: INTERNAL MEDICINE

## 2025-03-21 PROCEDURE — G2211 COMPLEX E/M VISIT ADD ON: HCPCS | Performed by: INTERNAL MEDICINE

## 2025-03-21 PROCEDURE — 99214 OFFICE O/P EST MOD 30 MIN: CPT | Performed by: INTERNAL MEDICINE

## 2025-03-21 PROCEDURE — 1123F ACP DISCUSS/DSCN MKR DOCD: CPT | Performed by: INTERNAL MEDICINE

## 2025-03-21 PROCEDURE — 3078F DIAST BP <80 MM HG: CPT | Performed by: INTERNAL MEDICINE

## 2025-03-21 PROCEDURE — 1159F MED LIST DOCD IN RCRD: CPT | Performed by: INTERNAL MEDICINE

## 2025-03-21 RX ORDER — CEPHALEXIN 500 MG/1
500 CAPSULE ORAL 4 TIMES DAILY
COMMUNITY
Start: 2025-03-14 | End: 2025-03-21

## 2025-04-01 ENCOUNTER — ANTI-COAG VISIT (OUTPATIENT)
Dept: PHARMACY | Age: 80
End: 2025-04-01
Payer: MEDICARE

## 2025-04-01 DIAGNOSIS — I48.91 ATRIAL FIBRILLATION, UNSPECIFIED TYPE (HCC): Primary | ICD-10-CM

## 2025-04-01 LAB
INTERNATIONAL NORMALIZATION RATIO, POC: 3.7
PROTHROMBIN TIME, POC: 0

## 2025-04-01 PROCEDURE — 85610 PROTHROMBIN TIME: CPT

## 2025-04-01 PROCEDURE — 99211 OFF/OP EST MAY X REQ PHY/QHP: CPT

## 2025-04-01 NOTE — PROGRESS NOTES
times/week, will start with once---> eating some zucchini and asparagus, sauerkraut, spinach---> had vit k once this week but plans to have 2x/week --> continues with 2 vit k servings /week ---> turnip greens yesterday --> eating vit k once a week---> had greens twice, large amount last night --> less vit k acutely --> continues with less greens  []   [x]       Change in alcohol use beer and moonshine. None recently d/t stomach ulcers. Normally in past would have 1-2 shots every night ---> nothing in past week --> no changes, continues with less alcohol (one beer occasionally) ---> regular beer, two last night --> regular beer 2-3 several nights/week---> 2 beers last night, more alcohol with holiday --> had more alcohol for the holiday---> normal routine --> has had more alcohol with the holidays, patient's son was in town -> no changes  []   [x]       Change in activity  []   [x]       Hospital admission Pt in hospital 3/10-3/11 for hematoma that developed after removal of q-pump on Sun. INR 1.6 today. Hematoma on the right inguinal area will take some time for it to reabsorb. Pt was instructed to hold warfarin and lovenox until Wed 3/13  [x]   []       Emergency department visit 11/27 for CP. --> 3/14 for vascular issues in leg, vein \"popped\", needs to f/u with vascular. Received 7 days of keflex   []   [x]       Other complaints   Clinical Outcomes:  Yes     No  []   [x]       Major bleeding event  []   [x]       Thromboembolic event    Duration of warfarin Therapy: indefinite   INR Range:  2.0-3.0  RF at [facility], switch to [employer].     First check had error code \"c\", pt states just had lab work done and everything was normal, not anemic, no iron. Monitor.     INR is 3.7 today   Hold dose tonight, then decrease weekly dose to 6mg on Tues, Thurs, and Sat, and 4mg all the other days of the week. (5.6% dec)  Encouraged to maintain a consistency of vegetables/salads as well as alcohol.  Recheck INR in 2 weeks, 4/15/25.

## 2025-04-15 ENCOUNTER — ANTI-COAG VISIT (OUTPATIENT)
Dept: PHARMACY | Age: 80
End: 2025-04-15
Payer: MEDICARE

## 2025-04-15 DIAGNOSIS — I48.91 ATRIAL FIBRILLATION, UNSPECIFIED TYPE (HCC): Primary | ICD-10-CM

## 2025-04-15 LAB
INTERNATIONAL NORMALIZATION RATIO, POC: 2.5
PROTHROMBIN TIME, POC: 0

## 2025-04-15 PROCEDURE — 99211 OFF/OP EST MAY X REQ PHY/QHP: CPT | Performed by: PHYSICIAN ASSISTANT

## 2025-04-15 PROCEDURE — 85610 PROTHROMBIN TIME: CPT | Performed by: PHYSICIAN ASSISTANT

## 2025-04-15 NOTE — PROGRESS NOTES
Mr. Manish Elliott is a 79 y.o. y/o male with history of Afib who presents today for anticoagulation monitoring and adjustment.    Pertinent PMH: HTN, DVT, CAD, factor V leiden, gastric bypass   Per cardio 7/2023:  Discussed changing to Eliquis once his GI issues have been resolved. Hx of GI bleed.  Pt pt has been on warfarin for many years prior to clinic managing   Patient Reported Findings:  Yes     No  [x]   []       Patient verifies current dosing regimen as listed takes warfarin 4 mg qd. Pt takes 6 mg on sun, mon, tues and thurs and 4 mg all other days of the week---> confirmed dose --> pt has been taking 6 mg daily---> confirmed      []   [x]       S/S bleeding/bruising/swelling/SOB   has black eye from unknown cause --> had a few nose bleeds in the past 2 weeks -> denies   []   [x]       Blood in urine or stool denies   []   [x]       Procedures scheduled in the future at this time Pulled a few teeth recently. Did not hold warfarin. Had endoscopy on 6/30, held warfarin and bridged with lovenox. Was found to have ulcers so taking meds. Will need to have repeat endoscopy in near future--> Once his INR is 2-3 for 4 weeks we will consider doing a DCCV to determine if he feels better in SR---> endoscopy 9/25, holding 5 days and bridging---> might have hernia repair in future---> 3/4, awaiting clearance and instructions---> surgeon apt 12/24 to discuss knee/foot -> denies --> will need vascular surgery at some point, nothing scheduled   []   [x]       Missed Dose denies    []   [x]       Extra Dose denies   []   [x]       Change in medications - tylenol --> no changes, a few tylenol --> got keflex in ER on 3/14, for 7 days. Finished --> no changes   [x]   []       Change in health/diet/appetite - cabbage, greens, broccoli, brussel sprouts. Is going to assess consistent amount in next few weeks---> had vit k 1-2 times in week---> no greens but wants to add Turnip greens in 1-2 times/week, will start with once--->

## 2025-05-01 ENCOUNTER — HOSPITAL ENCOUNTER (OUTPATIENT)
Dept: VASCULAR LAB | Age: 80
Discharge: HOME | End: 2025-05-03
Attending: INTERNAL MEDICINE
Payer: MEDICARE

## 2025-05-01 DIAGNOSIS — I83.029 VENOUS ULCER OF LEFT LOWER EXTREMITY WITH VARICOSE VEINS (HCC): ICD-10-CM

## 2025-05-01 DIAGNOSIS — I87.2 VENOUS INSUFFICIENCY OF BOTH LOWER EXTREMITIES: ICD-10-CM

## 2025-05-01 DIAGNOSIS — L97.929 VENOUS ULCER OF LEFT LOWER EXTREMITY WITH VARICOSE VEINS (HCC): ICD-10-CM

## 2025-05-01 DIAGNOSIS — L97.919 VENOUS ULCER OF RIGHT LOWER EXTREMITY WITH VARICOSE VEINS (HCC): ICD-10-CM

## 2025-05-01 DIAGNOSIS — I83.019 VENOUS ULCER OF RIGHT LOWER EXTREMITY WITH VARICOSE VEINS (HCC): ICD-10-CM

## 2025-05-01 LAB
VAS LEFT CFV RFX: 0.7 S
VAS LEFT FV RFX: 0.6 S
VAS LEFT GSV AT KNEE DIAM: 4.1 MM
VAS LEFT GSV AT KNEE RFX: 0.7 S
VAS LEFT GSV BK DIST DIAM: 2.4 MM
VAS LEFT GSV BK DIST RFX: 0.5 S
VAS LEFT GSV BK MID DIAM: 2.8 MM
VAS LEFT GSV BK MID RFX: 0.9 S
VAS LEFT GSV BK PROX DIAM: 3 MM
VAS LEFT GSV BK PROX RFX: 2.6 S
VAS LEFT GSV JUNC DIAM: 8.7 MM
VAS LEFT GSV THIGH DIST DIAM: 4.7 MM
VAS LEFT GSV THIGH MID DIAM: 4 MM
VAS LEFT GSV THIGH PROX DIAM: 4.6 MM
VAS LEFT GSV THIGH PROX RFX: 0.5 S
VAS LEFT GSV THIGHT MID RFX: 0.5 S
VAS LEFT PERFORATOR 2 DIAM: 1.5 MM
VAS LEFT PERFORATOR DIAM: 1.8 MM
VAS LEFT PERFORATOR RFX: 0.7 S
VAS LEFT POP RFX: 5.6 S
VAS LEFT SSV DIST DIAM: 2.6 MM
VAS LEFT SSV DIST RFX: 0.6 S
VAS LEFT SSV JUNCTION DIAM: 1.8 MM
VAS LEFT SSV MID DIAM: 2.2 MM
VAS LEFT SSV PROX DIAM: 1.2 MM
VAS RIGHT ATA DIST PSV: 46.1 CM/S
VAS RIGHT FV RFX: 0.7 S
VAS RIGHT GSV AT KNEE DIAM: 2.8 MM
VAS RIGHT GSV BK DIST DIAM: 2.6 MM
VAS RIGHT GSV BK DIST RFX: 0.7 S
VAS RIGHT GSV BK MID DIAM: 3 MM
VAS RIGHT GSV BK MID RFX: 0.5 S
VAS RIGHT GSV BK PROX DIAM: 1.1 MM
VAS RIGHT GSV JUNC DIAM: 4 MM
VAS RIGHT GSV THIGH DIST DIAM: 2.6 MM
VAS RIGHT GSV THIGH MID DIAM: 2.8 MM
VAS RIGHT GSV THIGH PROX DIAM: 3.5 MM
VAS RIGHT PERFORATOR 2 DIAM: 1.5 MM
VAS RIGHT PERFORATOR DIAM: 5.7 MM
VAS RIGHT PERFORATOR RFX: 1.3 S
VAS RIGHT PTA DIST PSV: 16 CM/S
VAS RIGHT SSV DIST DIAM: 2.8 MM
VAS RIGHT SSV JUNCTION DIAM: 1.4 MM
VAS RIGHT SSV JUNCTION REFLUX: 0.4 S
VAS RIGHT SSV MID DIAM: 1.7 MM
VAS RIGHT SSV PROX DIAM: 2 MM
VAS RIGHT SSV PROX RFX: 0.5 S

## 2025-05-01 PROCEDURE — 93970 EXTREMITY STUDY: CPT

## 2025-05-06 ENCOUNTER — ANTI-COAG VISIT (OUTPATIENT)
Dept: PHARMACY | Age: 80
End: 2025-05-06
Payer: MEDICARE

## 2025-05-06 DIAGNOSIS — I48.91 ATRIAL FIBRILLATION, UNSPECIFIED TYPE (HCC): Primary | ICD-10-CM

## 2025-05-06 LAB
INTERNATIONAL NORMALIZATION RATIO, POC: 1.7
PROTHROMBIN TIME, POC: 0

## 2025-05-06 PROCEDURE — 85610 PROTHROMBIN TIME: CPT

## 2025-05-06 PROCEDURE — 99211 OFF/OP EST MAY X REQ PHY/QHP: CPT

## 2025-05-06 NOTE — PROGRESS NOTES
Mr. Manish Elliott is a 80 y.o. y/o male with history of Afib who presents today for anticoagulation monitoring and adjustment.    Pertinent PMH: HTN, DVT, CAD, factor V leiden, gastric bypass   Per cardio 7/2023:  Discussed changing to Eliquis once his GI issues have been resolved. Hx of GI bleed.  Pt pt has been on warfarin for many years prior to clinic managing   Patient Reported Findings:  Yes     No  [x]   []       Patient verifies current dosing regimen as listed takes warfarin 4 mg qd. Pt takes 6 mg on sun, mon, tues and thurs and 4 mg all other days of the week---> confirmed dose --> pt has been taking 6 mg daily---> confirmed      []   [x]       S/S bleeding/bruising/swelling/SOB   has black eye from unknown cause --> had a few nose bleeds in the past 2 weeks -> denies   []   [x]       Blood in urine or stool denies   []   [x]       Procedures scheduled in the future at this time Pulled a few teeth recently. Did not hold warfarin. Had endoscopy on 6/30, held warfarin and bridged with lovenox. Was found to have ulcers so taking meds. Will need to have repeat endoscopy in near future--> Once his INR is 2-3 for 4 weeks we will consider doing a DCCV to determine if he feels better in SR---> endoscopy 9/25, holding 5 days and bridging---> might have hernia repair in future---> 3/4, awaiting clearance and instructions---> surgeon apt 12/24 to discuss knee/foot -> denies --> will need vascular surgery at some point, nothing scheduled ---> nothing yet  []   [x]       Missed Dose denies    []   [x]       Extra Dose denies   []   [x]       Change in medications - tylenol --> no changes, a few tylenol --> got keflex in ER on 3/14, for 7 days. Finished --> no changes   [x]   []       Change in health/diet/appetite - cabbage, greens, broccoli, brussel sprouts. Is going to assess consistent amount in next few weeks---> had vit k 1-2 times in week---> no greens but wants to add Turnip greens in 1-2 times/week, will start

## 2025-05-08 ENCOUNTER — TELEPHONE (OUTPATIENT)
Dept: PHARMACY | Age: 80
End: 2025-05-08

## 2025-05-08 NOTE — TELEPHONE ENCOUNTER
S/o called to let us know patient was prescribed Doxy and Augmentin for 10 days and started today, 5/8.     Take 4mg Saturday 5/10 and RTC Tuesday 5/13 for further adjustments dt interacting meds and fluctuating INRs recently.     Melissa Castillo, PharmD, [facility]  For Pharmacy Admin Tracking Only    Intervention Detail: Dose Adjustment: 1, reason: Interaction  Total # of Interventions Recommended: 1  Total # of Interventions Accepted: 1  Time Spent (min): 15

## 2025-05-13 ENCOUNTER — TELEPHONE (OUTPATIENT)
Dept: PHARMACY | Age: 80
End: 2025-05-13

## 2025-05-13 ENCOUNTER — APPOINTMENT (OUTPATIENT)
Dept: PHARMACY | Age: 80
End: 2025-05-13
Payer: MEDICARE

## 2025-05-13 NOTE — TELEPHONE ENCOUNTER
Spoke with Nancy.  She states they wrote down the appt on the wrong date and rescheduled patient for 5/14.

## 2025-05-14 ENCOUNTER — ANTI-COAG VISIT (OUTPATIENT)
Dept: PHARMACY | Age: 80
End: 2025-05-14
Payer: MEDICARE

## 2025-05-14 DIAGNOSIS — I48.91 ATRIAL FIBRILLATION, UNSPECIFIED TYPE (HCC): Primary | ICD-10-CM

## 2025-05-14 LAB
INTERNATIONAL NORMALIZATION RATIO, POC: 1.5
PROTHROMBIN TIME, POC: 0

## 2025-05-14 PROCEDURE — 85610 PROTHROMBIN TIME: CPT | Performed by: PHYSICIAN ASSISTANT

## 2025-05-14 PROCEDURE — 99212 OFFICE O/P EST SF 10 MIN: CPT | Performed by: PHYSICIAN ASSISTANT

## 2025-05-14 NOTE — PROGRESS NOTES
Mr. Manish Elliott is a 80 y.o. y/o male with history of Afib who presents today for anticoagulation monitoring and adjustment.    Pertinent PMH: HTN, DVT, CAD, factor V leiden, gastric bypass   Per cardio 7/2023:  Discussed changing to Eliquis once his GI issues have been resolved. Hx of GI bleed.  Pt pt has been on warfarin for many years prior to clinic managing   Patient Reported Findings:  Yes     No  [x]   []       Patient verifies current dosing regimen as listed takes warfarin 4 mg qd. Pt takes 6 mg on sun, mon, tues and thurs and 4 mg all other days of the week---> confirmed dose --> pt has been taking 6 mg daily---> confirmed      []   [x]       S/S bleeding/bruising/swelling/SOB   has black eye from unknown cause --> had a few nose bleeds in the past 2 weeks -> denies   []   [x]       Blood in urine or stool denies   []   [x]       Procedures scheduled in the future at this time Pulled a few teeth recently. Did not hold warfarin. Had endoscopy on 6/30, held warfarin and bridged with lovenox. Was found to have ulcers so taking meds. Will need to have repeat endoscopy in near future--> Once his INR is 2-3 for 4 weeks we will consider doing a DCCV to determine if he feels better in SR---> endoscopy 9/25, holding 5 days and bridging---> might have hernia repair in future---> 3/4, awaiting clearance and instructions---> surgeon apt 12/24 to discuss knee/foot -> denies --> will need vascular surgery at some point, nothing scheduled ---> nothing yet  []   [x]       Missed Dose denies    []   [x]       Extra Dose denies   [x]   []       Change in medications - tylenol --> no changes, a few tylenol --> got keflex in ER on 3/14, for 7 days. Finished --> doxycycline and augmentin x 10 d on 5/8 (has 4 days left)   [x]   []       Change in health/diet/appetite - cabbage, greens, broccoli, brussel sprouts. Is going to assess consistent amount in next few weeks---> had vit k 1-2 times in week---> no greens but wants to

## 2025-05-23 ENCOUNTER — ANTI-COAG VISIT (OUTPATIENT)
Dept: PHARMACY | Age: 80
End: 2025-05-23
Payer: MEDICARE

## 2025-05-23 DIAGNOSIS — I48.91 ATRIAL FIBRILLATION, UNSPECIFIED TYPE (HCC): Primary | ICD-10-CM

## 2025-05-23 LAB
INTERNATIONAL NORMALIZATION RATIO, POC: 1.4
PROTHROMBIN TIME, POC: 0

## 2025-05-23 PROCEDURE — 85610 PROTHROMBIN TIME: CPT | Performed by: PHYSICIAN ASSISTANT

## 2025-05-23 PROCEDURE — 99212 OFFICE O/P EST SF 10 MIN: CPT | Performed by: PHYSICIAN ASSISTANT

## 2025-05-23 NOTE — PROGRESS NOTES
Mr. Manish Elliott is a 80 y.o. y/o male with history of Afib who presents today for anticoagulation monitoring and adjustment.    Pertinent PMH: HTN, DVT, CAD, factor V leiden, gastric bypass   Per cardio 7/2023:  Discussed changing to Eliquis once his GI issues have been resolved. Hx of GI bleed.  Pt pt has been on warfarin for many years prior to clinic managing   Patient Reported Findings:  Yes     No  [x]   []       Patient verifies current dosing regimen as listed takes warfarin 4 mg qd. Pt takes 6 mg on sun, mon, tues and thurs and 4 mg all other days of the week---> confirmed dose --> pt has been taking 6 mg daily---> confirmed   []   [x]       S/S bleeding/bruising/swelling/SOB   has black eye from unknown cause --> had a few nose bleeds in the past 2 weeks -> denies   []   [x]       Blood in urine or stool denies   []   [x]       Procedures scheduled in the future at this time Pulled a few teeth recently. Did not hold warfarin. Had endoscopy on 6/30, held warfarin and bridged with lovenox. Was found to have ulcers so taking meds. Will need to have repeat endoscopy in near future--> Once his INR is 2-3 for 4 weeks we will consider doing a DCCV to determine if he feels better in SR---> endoscopy 9/25, holding 5 days and bridging---> might have hernia repair in future---> 3/4, awaiting clearance and instructions---> surgeon apt 12/24 to discuss knee/foot ->  will need vascular surgery at some point, nothing scheduled ---> nothing yet  []   [x]       Missed Dose denies    []   [x]       Extra Dose denies   [x]   []       Change in medications - tylenol --> no changes, a few tylenol --> got keflex in ER on 3/14, for 7 days. Finished --> doxycycline and augmentin x 10 d on 5/8 (has 4 days left) --> finished abx last week    [x]   []       Change in health/diet/appetite - cabbage, greens, broccoli, brussel sprouts. Is going to assess consistent amount in next few weeks---> had vit k 1-2 times in week---> no

## 2025-05-30 ENCOUNTER — RESULTS FOLLOW-UP (OUTPATIENT)
Dept: CARDIOLOGY CLINIC | Age: 80
End: 2025-05-30

## 2025-05-30 ENCOUNTER — ANTI-COAG VISIT (OUTPATIENT)
Dept: PHARMACY | Age: 80
End: 2025-05-30
Payer: MEDICARE

## 2025-05-30 DIAGNOSIS — I48.91 ATRIAL FIBRILLATION, UNSPECIFIED TYPE (HCC): Primary | ICD-10-CM

## 2025-05-30 LAB
INTERNATIONAL NORMALIZATION RATIO, POC: 1.4
PROTHROMBIN TIME, POC: 0

## 2025-05-30 PROCEDURE — 85610 PROTHROMBIN TIME: CPT

## 2025-05-30 PROCEDURE — 99212 OFFICE O/P EST SF 10 MIN: CPT

## 2025-05-30 NOTE — PROGRESS NOTES
Mr. Manish Elliott is a 80 y.o. y/o male with history of Afib who presents today for anticoagulation monitoring and adjustment.    Pertinent PMH: HTN, DVT, CAD, factor V leiden, gastric bypass   Per cardio 7/2023:  Discussed changing to Eliquis once his GI issues have been resolved. Hx of GI bleed.  Pt pt has been on warfarin for many years prior to clinic managing   Patient Reported Findings:  Yes     No  [x]   []       Patient verifies current dosing regimen as listed takes warfarin 4 mg qd. Pt takes 6 mg on sun, mon, tues and thurs and 4 mg all other days of the week---> confirmed dose --> pt has been taking 6 mg daily---> confirmed   []   [x]       S/S bleeding/bruising/swelling/SOB   has black eye from unknown cause --> had a few nose bleeds in the past 2 weeks -> denies   []   [x]       Blood in urine or stool denies   []   [x]       Procedures scheduled in the future at this time Pulled a few teeth recently. Did not hold warfarin. Had endoscopy on 6/30, held warfarin and bridged with lovenox. Was found to have ulcers so taking meds. Will need to have repeat endoscopy in near future--> Once his INR is 2-3 for 4 weeks we will consider doing a DCCV to determine if he feels better in SR---> endoscopy 9/25, holding 5 days and bridging---> might have hernia repair in future---> 3/4, awaiting clearance and instructions---> surgeon apt 12/24 to discuss knee/foot ->  will need vascular surgery at some point, nothing scheduled ---> nothing yet  []   [x]       Missed Dose denies    []   [x]       Extra Dose denies   [x]   []       Change in medications - tylenol --> no changes, a few tylenol --> got keflex in ER on 3/14, for 7 days. Finished --> doxycycline and augmentin x 10 d on 5/8 (has 4 days left) --> finished abx last week---> denies changes     [x]   []       Change in health/diet/appetite - cabbage, greens, broccoli, brussel sprouts. Is going to assess consistent amount in next few weeks---> had vit k 1-2 times

## 2025-06-02 ENCOUNTER — TELEPHONE (OUTPATIENT)
Dept: CARDIOLOGY CLINIC | Age: 80
End: 2025-06-02

## 2025-06-02 NOTE — TELEPHONE ENCOUNTER
Date of Procedure: Monday 9/22/25 @ [facility] with Dr. Mireles     Procedure time: 1:00 pm     Called and spoke to Manish and he is agreeable to date and time. I sent Manish a [employer] message with his RF Ablation procedure information/instructions. Encouraged to call with any questions or concerns.

## 2025-06-06 ENCOUNTER — ANTI-COAG VISIT (OUTPATIENT)
Dept: PHARMACY | Age: 80
End: 2025-06-06
Payer: MEDICARE

## 2025-06-06 DIAGNOSIS — I48.91 ATRIAL FIBRILLATION, UNSPECIFIED TYPE (HCC): Primary | ICD-10-CM

## 2025-06-06 LAB
INTERNATIONAL NORMALIZATION RATIO, POC: 1.7
PROTHROMBIN TIME, POC: 0

## 2025-06-06 PROCEDURE — 99212 OFFICE O/P EST SF 10 MIN: CPT | Performed by: PHYSICIAN ASSISTANT

## 2025-06-06 PROCEDURE — 85610 PROTHROMBIN TIME: CPT | Performed by: PHYSICIAN ASSISTANT

## 2025-06-06 NOTE — PROGRESS NOTES
Mr. Manish Elliott is a 80 y.o. y/o male with history of Afib who presents today for anticoagulation monitoring and adjustment.    Pertinent PMH: HTN, DVT, CAD, factor V leiden, gastric bypass   Per cardio 7/2023:  Discussed changing to Eliquis once his GI issues have been resolved. Hx of GI bleed.  Pt pt has been on warfarin for many years prior to clinic managing   Patient Reported Findings:  Yes     No  [x]   []       Patient verifies current dosing regimen as listed takes warfarin 4 mg qd. Pt takes 6 mg on sun, mon, tues and thurs and 4 mg all other days of the week---> confirmed dose --> pt has been taking 6 mg daily---> confirmed   [x]   []       S/S bleeding/bruising/swelling/SOB   has black eye from unknown cause --> had a few nose bleeds in the past 2 weeks -> had a nose bleed this am    []   [x]       Blood in urine or stool denies   [x]   []       Procedures scheduled in the future at this time Pulled a few teeth recently. Did not hold warfarin. Had endoscopy on 6/30, held warfarin and bridged with lovenox. Was found to have ulcers so taking meds. Will need to have repeat endoscopy in near future--> Once his INR is 2-3 for 4 weeks we will consider doing a DCCV to determine if he feels better in SR---> endoscopy 9/25, holding 5 days and bridging---> might have hernia repair in future---> 3/4, awaiting clearance and instructions---> surgeon apt 12/24 to discuss knee/foot ->  will need vascular surgery at some point, nothing scheduled ---> ablation scheduled 9/22  []   [x]       Missed Dose denies    []   [x]       Extra Dose denies   []   [x]       Change in medications - tylenol --> no changes, a few tylenol --> got keflex in ER on 3/14, for 7 days. Finished --> doxycycline and augmentin x 10 d on 5/8 (has 4 days left) --> finished abx last week---> denies changes     [x]   []       Change in health/diet/appetite - cabbage, greens, broccoli, brussel sprouts. Is going to assess consistent amount in next few

## 2025-06-07 PROCEDURE — 93296 REM INTERROG EVL PM/IDS: CPT | Performed by: INTERNAL MEDICINE

## 2025-06-07 PROCEDURE — 93294 REM INTERROG EVL PM/LDLS PM: CPT | Performed by: INTERNAL MEDICINE

## 2025-06-13 ENCOUNTER — ANTI-COAG VISIT (OUTPATIENT)
Dept: PHARMACY | Age: 80
End: 2025-06-13
Payer: MEDICARE

## 2025-06-13 DIAGNOSIS — I48.91 ATRIAL FIBRILLATION, UNSPECIFIED TYPE (HCC): Primary | ICD-10-CM

## 2025-06-13 LAB
INTERNATIONAL NORMALIZATION RATIO, POC: 2.1
PROTHROMBIN TIME, POC: 0

## 2025-06-13 PROCEDURE — 99211 OFF/OP EST MAY X REQ PHY/QHP: CPT

## 2025-06-13 PROCEDURE — 85610 PROTHROMBIN TIME: CPT

## 2025-06-13 NOTE — PROGRESS NOTES
consistent amount in next few weeks---> had vit k 1-2 times in week---> no greens but wants to add Turnip greens in 1-2 times/week, will start with once---> eating some zucchini and asparagus, sauerkraut, spinach---> had vit k once this week but plans to have 2x/week --> continues with 2 vit k servings /week ---> turnip greens yesterday --> eating vit k once a week---> had greens twice, large amount last night --> less vit k acutely --> continues with less greens --> had broccoli recently---> increased greens recently --> no vit k in the past week --> had greens, broccoli, in past week.---> apple cider vinegar --> appetite less, no vit k this week (celery and green beans) but plans to have salad this weekend ---> no changes   [x]   []       Change in alcohol use beer and moonshine. None recently d/t stomach ulcers. Normally in past would have 1-2 shots every night ---> nothing in past week --> no changes, continues with less alcohol (one beer occasionally) ---> regular beer, two last night --> regular beer 2-3 several nights/week---> less alcohol recently - no liquor --> no liquor in 2 weeks unsure if will restart ---> beer recently but nothing much --> no changes, continues with less  []   [x]       Change in activity  []   [x]       Hospital admission Pt in hospital 3/10-3/11 for hematoma that developed after removal of q-pump on Sun. INR 1.6 today. Hematoma on the right inguinal area will take some time for it to reabsorb. Pt was instructed to hold warfarin and lovenox until Wed 3/13  []   [x]       Emergency department visit 11/27 for CP. --> 3/14 for vascular issues in leg, vein \"popped\", needs to f/u with vascular. Received 7 days of keflex   []   [x]       Other complaints   Clinical Outcomes:  Yes     No  []   [x]       Major bleeding event  []   [x]       Thromboembolic event    Duration of warfarin Therapy: indefinite   INR Range:  2.0-3.0  RF at [facility], switch to [employer].     Historically has had error code

## 2025-06-23 ENCOUNTER — ANTI-COAG VISIT (OUTPATIENT)
Dept: PHARMACY | Age: 80
End: 2025-06-23
Payer: MEDICARE

## 2025-06-23 DIAGNOSIS — I48.0 PAROXYSMAL ATRIAL FIBRILLATION (HCC): Primary | ICD-10-CM

## 2025-06-23 LAB
INTERNATIONAL NORMALIZATION RATIO, POC: 1.9
PROTHROMBIN TIME, POC: 0

## 2025-06-23 PROCEDURE — 85610 PROTHROMBIN TIME: CPT

## 2025-06-23 PROCEDURE — 99212 OFFICE O/P EST SF 10 MIN: CPT

## 2025-06-23 NOTE — PROGRESS NOTES
Mr. Manish Elliott is a 80 y.o. y/o male with history of Afib who presents today for anticoagulation monitoring and adjustment.    Pertinent PMH: HTN, DVT, CAD, factor V leiden, gastric bypass   Per cardio 7/2023:  Discussed changing to Eliquis once his GI issues have been resolved. Hx of GI bleed.  Pt pt has been on warfarin for many years prior to clinic managing   Patient Reported Findings:  Yes     No  [x]   []       Patient verifies current dosing regimen as listed takes warfarin 4 mg qd. Pt takes 6 mg on sun, mon, tues and thurs and 4 mg all other days of the week---> confirmed dose --> pt has been taking 6 mg daily---> confirmed   [x]   []       S/S bleeding/bruising/swelling/SOB   has black eye from unknown cause --> had a few nose bleeds in the past 2 weeks -> had a nose bleed this am---> denies     []   [x]       Blood in urine or stool denies   []   [x]       Procedures scheduled in the future at this time Pulled a few teeth recently. Did not hold warfarin. Had endoscopy on 6/30, held warfarin and bridged with lovenox. Was found to have ulcers so taking meds. Will need to have repeat endoscopy in near future--> Once his INR is 2-3 for 4 weeks we will consider doing a DCCV to determine if he feels better in SR---> endoscopy 9/25, holding 5 days and bridging---> might have hernia repair in future---> 3/4, awaiting clearance and instructions---> surgeon apt 12/24 to discuss knee/foot ->  will need vascular surgery at some point, nothing scheduled ---> ablation scheduled 9/22---> none   []   [x]       Missed Dose denies    []   [x]       Extra Dose denies   []   [x]       Change in medications - tylenol --> no changes, a few tylenol --> got keflex in ER on 3/14, for 7 days. Finished --> doxycycline and augmentin x 10 d on 5/8 (has 4 days left) --> finished abx last week---> creams for bites on arm  [x]   []       Change in health/diet/appetite - cabbage, greens, broccoli, brussel sprouts. Is going to assess

## 2025-07-03 ENCOUNTER — ANTI-COAG VISIT (OUTPATIENT)
Dept: PHARMACY | Age: 80
End: 2025-07-03
Payer: MEDICARE

## 2025-07-03 DIAGNOSIS — I48.91 ATRIAL FIBRILLATION, UNSPECIFIED TYPE (HCC): Primary | ICD-10-CM

## 2025-07-03 LAB
INTERNATIONAL NORMALIZATION RATIO, POC: 1.7
PROTHROMBIN TIME, POC: 0

## 2025-07-03 PROCEDURE — 99212 OFFICE O/P EST SF 10 MIN: CPT

## 2025-07-03 PROCEDURE — 85610 PROTHROMBIN TIME: CPT

## 2025-07-03 NOTE — PROGRESS NOTES
Mr. Manish Elliott is a 80 y.o. y/o male with history of Afib who presents today for anticoagulation monitoring and adjustment.    Pertinent PMH: HTN, DVT, CAD, factor V leiden, gastric bypass   Per cardio 7/2023:  Discussed changing to Eliquis once his GI issues have been resolved. Hx of GI bleed.  Pt pt has been on warfarin for many years prior to clinic managing   Patient Reported Findings:  Yes     No  [x]   []       Patient verifies current dosing regimen as listed takes warfarin 4 mg qd. Pt takes 6 mg on sun, mon, tues and thurs and 4 mg all other days of the week---> confirmed dose --> pt has been taking 6 mg daily---> confirmed   [x]   []       S/S bleeding/bruising/swelling/SOB   has black eye from unknown cause --> had a few nose bleeds in the past 2 weeks -> had a nose bleed this am---> denies     []   [x]       Blood in urine or stool denies   []   [x]       Procedures scheduled in the future at this time Pulled a few teeth recently. Did not hold warfarin. Had endoscopy on 6/30, held warfarin and bridged with lovenox. Was found to have ulcers so taking meds. Will need to have repeat endoscopy in near future--> Once his INR is 2-3 for 4 weeks we will consider doing a DCCV to determine if he feels better in SR---> endoscopy 9/25, holding 5 days and bridging---> might have hernia repair in future---> 3/4, awaiting clearance and instructions---> surgeon apt 12/24 to discuss knee/foot ->  will need vascular surgery at some point, nothing scheduled ---> ablation scheduled 9/22---> none   []   [x]       Missed Dose denies    []   [x]       Extra Dose denies   []   [x]       Change in medications - tylenol --> no changes, a few tylenol --> got keflex in ER on 3/14, for 7 days. Finished --> doxycycline and augmentin x 10 d on 5/8 (has 4 days left) --> finished abx last week---> creams for bites on arm---> denies   [x]   []       Change in health/diet/appetite - cabbage, greens, broccoli, brussel sprouts. Is going

## 2025-07-15 DIAGNOSIS — I48.91 ATRIAL FIBRILLATION, UNSPECIFIED TYPE (HCC): Primary | ICD-10-CM

## 2025-07-17 ENCOUNTER — ANTI-COAG VISIT (OUTPATIENT)
Dept: PHARMACY | Age: 80
End: 2025-07-17
Payer: MEDICARE

## 2025-07-17 DIAGNOSIS — I48.91 ATRIAL FIBRILLATION, UNSPECIFIED TYPE (HCC): Primary | ICD-10-CM

## 2025-07-17 LAB
INTERNATIONAL NORMALIZATION RATIO, POC: 2.5
PROTHROMBIN TIME, POC: 0

## 2025-07-17 PROCEDURE — 85610 PROTHROMBIN TIME: CPT | Performed by: PHYSICIAN ASSISTANT

## 2025-07-17 PROCEDURE — 99211 OFF/OP EST MAY X REQ PHY/QHP: CPT | Performed by: PHYSICIAN ASSISTANT

## 2025-07-17 NOTE — PROGRESS NOTES
2.0-3.0  RF at [facility], switch to [employer].     Historically has had error code \"c\", pt stated just had lab work done and everything was normal, not anemic, no iron. Monitor. ---> most recently had work done and hematocrit was high    INR is 2.5 today after additional dose increase  Continue dose of 8 mg on Mon, Wed, Fri and Sat and 6 mg all other days of the week    Encouraged to maintain a consistency of vegetables/salads as well as alcohol.  Recheck INR in 2 weeks, 7/31    Patient consent signed 8/11/24    Referring is Harman Oconnor CNP  INR (no units)   Date Value   07/07/2023 1.50   12/18/2017 1.13     INR,(POC) (no units)   Date Value   07/03/2025 1.7   06/23/2025 1.9   06/13/2025 2.1   06/06/2025 1.7     For Pharmacy Admin Tracking Only    Total # of Interventions Recommended: 0  Total # of Interventions Accepted: 0  Time Spent (min): 15

## 2025-07-30 ENCOUNTER — TELEPHONE (OUTPATIENT)
Dept: PHARMACY | Age: 80
End: 2025-07-30

## 2025-07-30 NOTE — TELEPHONE ENCOUNTER
Called patient to  from 7/31 to 8/1 dt coverage. Patient had a family emergency and is coming back from out of town anyways.  Melissa Castillo, PharmD, [facility]

## 2025-07-31 ENCOUNTER — APPOINTMENT (OUTPATIENT)
Dept: PHARMACY | Age: 80
End: 2025-07-31
Payer: MEDICARE

## 2025-08-01 ENCOUNTER — ANTI-COAG VISIT (OUTPATIENT)
Dept: PHARMACY | Age: 80
End: 2025-08-01
Payer: MEDICARE

## 2025-08-01 DIAGNOSIS — I48.91 ATRIAL FIBRILLATION, UNSPECIFIED TYPE (HCC): Primary | ICD-10-CM

## 2025-08-01 LAB
INTERNATIONAL NORMALIZATION RATIO, POC: 5.9
PROTHROMBIN TIME, POC: 0

## 2025-08-01 PROCEDURE — 99212 OFFICE O/P EST SF 10 MIN: CPT

## 2025-08-01 PROCEDURE — 85610 PROTHROMBIN TIME: CPT

## 2025-08-01 NOTE — PROGRESS NOTES
Mr. Manish Elliott is a 80 y.o. y/o male with history of Afib who presents today for anticoagulation monitoring and adjustment.    Pertinent PMH: HTN, DVT, CAD, factor V leiden, gastric bypass   Per cardio 7/2023:  Discussed changing to Eliquis once his GI issues have been resolved. Hx of GI bleed.  Pt pt has been on warfarin for many years prior to clinic managing   Patient Reported Findings:  Yes     No  [x]   []       Patient verifies current dosing regimen as listed takes warfarin 4 mg qd. Pt takes 6 mg on sun, mon, tues and thurs and 4 mg all other days of the week---> confirmed dose --> pt has been taking 6 mg daily---> confirmed   []   [x]       S/S bleeding/bruising/swelling/SOB   has black eye from unknown cause --> had a few nose bleeds in the past 2 weeks -> had a nose bleed this am---> denies ---> nosebleed     []   [x]       Blood in urine or stool denies   []   [x]       Procedures scheduled in the future at this time Pulled a few teeth recently. Did not hold warfarin. Had endoscopy on 6/30, held warfarin and bridged with lovenox. Was found to have ulcers so taking meds. Will need to have repeat endoscopy in near future--> Once his INR is 2-3 for 4 weeks we will consider doing a DCCV to determine if he feels better in SR---> endoscopy 9/25, holding 5 days and bridging---> might have hernia repair in future---> 3/4, awaiting clearance and instructions---> surgeon apt 12/24 to discuss knee/foot ->  will need vascular surgery at some point, nothing scheduled ---> ablation scheduled 9/22---> none   []   [x]       Missed Dose denies    []   [x]       Extra Dose denies   [x]   []       Change in medications - tylenol --> no changes, a few tylenol --> got keflex in ER on 3/14, for 7 days. Finished --> finished abx last week---> creams for bites on arm---> took sinus medication recently ---> omeprazole and allergy relief, increased tylenol   []   [x]       Change in health/diet/appetite - cabbage, greens,

## 2025-08-04 DIAGNOSIS — I48.91 ATRIAL FIBRILLATION, UNSPECIFIED TYPE (HCC): Primary | ICD-10-CM

## 2025-08-14 ENCOUNTER — ANTI-COAG VISIT (OUTPATIENT)
Dept: PHARMACY | Age: 80
End: 2025-08-14
Payer: MEDICARE

## 2025-08-14 DIAGNOSIS — I48.91 ATRIAL FIBRILLATION, UNSPECIFIED TYPE (HCC): Primary | ICD-10-CM

## 2025-08-14 LAB
INTERNATIONAL NORMALIZATION RATIO, POC: 6.7
PROTHROMBIN TIME, POC: NORMAL

## 2025-08-14 PROCEDURE — 99212 OFFICE O/P EST SF 10 MIN: CPT

## 2025-08-14 PROCEDURE — 85610 PROTHROMBIN TIME: CPT

## 2025-08-21 ENCOUNTER — ANTI-COAG VISIT (OUTPATIENT)
Dept: PHARMACY | Age: 80
End: 2025-08-21
Payer: MEDICARE

## 2025-08-21 DIAGNOSIS — I48.91 ATRIAL FIBRILLATION, UNSPECIFIED TYPE (HCC): Primary | ICD-10-CM

## 2025-08-21 LAB
INTERNATIONAL NORMALIZATION RATIO, POC: 2.8
PROTHROMBIN TIME, POC: NORMAL

## 2025-08-21 PROCEDURE — 85610 PROTHROMBIN TIME: CPT

## 2025-08-21 PROCEDURE — 99211 OFF/OP EST MAY X REQ PHY/QHP: CPT

## 2025-08-28 ENCOUNTER — ANTI-COAG VISIT (OUTPATIENT)
Dept: PHARMACY | Age: 80
End: 2025-08-28
Payer: MEDICARE

## 2025-08-28 DIAGNOSIS — I48.91 ATRIAL FIBRILLATION, UNSPECIFIED TYPE (HCC): Primary | ICD-10-CM

## 2025-08-28 LAB
INTERNATIONAL NORMALIZATION RATIO, POC: 5
PROTHROMBIN TIME, POC: NORMAL

## 2025-08-28 PROCEDURE — 85610 PROTHROMBIN TIME: CPT | Performed by: PHYSICIAN ASSISTANT

## 2025-08-28 PROCEDURE — 99212 OFFICE O/P EST SF 10 MIN: CPT | Performed by: PHYSICIAN ASSISTANT
